# Patient Record
Sex: FEMALE | Race: WHITE | NOT HISPANIC OR LATINO | URBAN - METROPOLITAN AREA
[De-identification: names, ages, dates, MRNs, and addresses within clinical notes are randomized per-mention and may not be internally consistent; named-entity substitution may affect disease eponyms.]

---

## 2018-09-28 PROBLEM — I10 ESSENTIAL HYPERTENSION: Status: ACTIVE | Noted: 2018-09-28

## 2018-09-28 PROBLEM — E78.2 MIXED HYPERLIPIDEMIA: Status: ACTIVE | Noted: 2018-09-28

## 2018-09-28 PROBLEM — Z98.890 HISTORY OF LOOP RECORDER: Status: ACTIVE | Noted: 2018-09-28

## 2018-09-28 PROBLEM — I63.9 CVA (CEREBRAL VASCULAR ACCIDENT) (CMS/HCC): Status: ACTIVE | Noted: 2018-09-28

## 2018-09-28 PROBLEM — E11.9 DIABETES MELLITUS TYPE II, NON INSULIN DEPENDENT (CMS/HCC): Status: ACTIVE | Noted: 2018-09-28

## 2018-10-02 ENCOUNTER — OFFICE VISIT (OUTPATIENT)
Dept: CARDIOLOGY | Facility: CLINIC | Age: 68
End: 2018-10-02
Payer: COMMERCIAL

## 2018-10-02 VITALS
SYSTOLIC BLOOD PRESSURE: 118 MMHG | WEIGHT: 213.6 LBS | BODY MASS INDEX: 36.47 KG/M2 | DIASTOLIC BLOOD PRESSURE: 76 MMHG | OXYGEN SATURATION: 95 % | HEART RATE: 84 BPM | HEIGHT: 64 IN

## 2018-10-02 DIAGNOSIS — I10 ESSENTIAL HYPERTENSION: Primary | ICD-10-CM

## 2018-10-02 DIAGNOSIS — R73.9 HYPERGLYCEMIA: ICD-10-CM

## 2018-10-02 PROBLEM — E11.9 DIABETES MELLITUS TYPE II, NON INSULIN DEPENDENT (CMS/HCC): Status: RESOLVED | Noted: 2018-09-28 | Resolved: 2018-10-02

## 2018-10-02 PROBLEM — T84.82XA: Status: ACTIVE | Noted: 2018-10-02

## 2018-10-02 PROCEDURE — 99204 OFFICE O/P NEW MOD 45 MIN: CPT | Performed by: INTERNAL MEDICINE

## 2018-10-02 PROCEDURE — 93000 ELECTROCARDIOGRAM COMPLETE: CPT | Performed by: INTERNAL MEDICINE

## 2018-10-02 RX ORDER — CHLORTHALIDONE 25 MG/1
25 TABLET ORAL DAILY
Qty: 90 TABLET | Refills: 5 | Status: SHIPPED | OUTPATIENT
Start: 2018-10-02 | End: 2018-11-13 | Stop reason: SDUPTHER

## 2018-10-02 RX ORDER — ATORVASTATIN CALCIUM 10 MG/1
10 TABLET, FILM COATED ORAL DAILY
Qty: 90 TABLET | Refills: 5 | Status: SHIPPED | OUTPATIENT
Start: 2018-10-02 | End: 2018-11-13 | Stop reason: SDUPTHER

## 2018-10-02 RX ORDER — POTASSIUM CHLORIDE 1.5 G/1.58G
20 POWDER, FOR SOLUTION ORAL DAILY
COMMUNITY
End: 2018-11-13 | Stop reason: SDUPTHER

## 2018-10-02 RX ORDER — ATORVASTATIN CALCIUM 10 MG/1
10 TABLET, FILM COATED ORAL DAILY
COMMUNITY
End: 2018-10-02 | Stop reason: SDUPTHER

## 2018-10-02 RX ORDER — CHLORTHALIDONE 25 MG/1
25 TABLET ORAL DAILY
COMMUNITY
End: 2018-10-02 | Stop reason: SDUPTHER

## 2018-10-02 RX ORDER — ASPIRIN 81 MG/1
81 TABLET ORAL DAILY
COMMUNITY

## 2018-10-02 ASSESSMENT — ENCOUNTER SYMPTOMS
DYSPNEA ON EXERTION: 0
INSOMNIA: 0
MUSCLE CRAMPS: 0
BRUISES/BLEEDS EASILY: 0
HEARTBURN: 0
WEIGHT GAIN: 0
DIARRHEA: 0
LIGHT-HEADEDNESS: 0
DIZZINESS: 0
HOARSE VOICE: 0
WEIGHT LOSS: 0
HEMATOLOGIC/LYMPHATIC NEGATIVE: 1
CHANGE IN BOWEL HABIT: 0
TREMORS: 0
FOCAL WEAKNESS: 0
FREQUENCY: 0
NEAR-SYNCOPE: 0
HEMOPTYSIS: 0
PALPITATIONS: 0
JAUNDICE: 0
IRREGULAR HEARTBEAT: 0
WHEEZING: 0
NERVOUS/ANXIOUS: 0
SLEEP DISTURBANCES DUE TO BREATHING: 0
ORTHOPNEA: 0
SPUTUM PRODUCTION: 0
SNORING: 0
SHORTNESS OF BREATH: 0
MYALGIAS: 0
CLAUDICATION: 0
FALLS: 0
ANOREXIA: 0
PND: 0
NAUSEA: 0
ABDOMINAL PAIN: 0
NUMBNESS: 0
CONSTIPATION: 0
MEMORY LOSS: 0
HEADACHES: 0
VERTIGO: 0
SYNCOPE: 0
COUGH: 0

## 2018-10-02 NOTE — ASSESSMENT & PLAN NOTE
In 2013, she had a visual field cut and a stroke evaluate [facility] placed on medical therapy no vascular disease identified he had a long-term implantable monitor that was negative for atrial fibrillation she has not followed up with him in a years no clinical recurrence

## 2018-10-02 NOTE — PROGRESS NOTES
Cardiology Consult/New Patient    Jonathan Benoit Sr.,           Prachi Lopez is a 68 y.o. female identifies as who presents with     She is here to establish cardiac care  She is a history of what sounds like a TIA with visual field cut back in 2013  She was evaluated [facility]  They recommended a long-term monitor she did have an implantable device no episodes of atrial fibrillation were picked up at that time  No history of vascular disease from what she tells me  He remains on blood pressure medications statin and aspirin no recurrence  No known obstructive CAD no chest pain mild shortness of breath with exertion    Patient Active Problem List    Diagnosis Date Noted   • Hyperglycemia 10/02/2018   • Essential hypertension 09/28/2018   • Mixed hyperlipidemia 09/28/2018   • CVA (cerebral vascular accident) (CMS/HCC) ([facility]) 09/28/2018   • History of loop recorder 09/28/2018       Medical History:   Past Medical History:   Diagnosis Date   • Hyperlipidemia    • Hypertension    • Stroke (CMS/HCC) ([facility])        Surgical History:   Past Surgical History:   Procedure Laterality Date   • JOINT REPLACEMENT         Allergies: No known drug allergies    Current Outpatient Prescriptions   Medication Sig Dispense Refill   • aspirin 81 mg enteric coated tablet Take 81 mg by mouth daily.     • atorvastatin (LIPITOR) 10 mg tablet Take 1 tablet (10 mg total) by mouth daily. 90 tablet 5   • chlorthalidone (HYGROTEN) 25 mg tablet Take 1 tablet (25 mg total) by mouth daily. 90 tablet 5   • potassium chloride 20 mEq packet Take 20 mEq by mouth daily.       No current facility-administered medications for this visit.        Social History:   Social History     Social History   • Marital status:      Spouse name: N/A   • Number of children: N/A   • Years of education: N/A     Social History Main Topics   • Smoking status: Never Smoker   • Smokeless tobacco: Never Used   • Alcohol use No   • Drug use: Unknown   •  Sexual activity: Not Asked     Other Topics Concern   • None     Social History Narrative   • None       Family History:   Family History   Problem Relation Age of Onset   • Heart disease Mother    • Heart disease Father        Review of Systems   Review of Systems   Constitution: Negative for malaise/fatigue, weight gain and weight loss.   HENT: Negative for hearing loss and hoarse voice.    Eyes: Negative for visual disturbance.   Cardiovascular: Negative for chest pain, claudication, cyanosis, dyspnea on exertion, irregular heartbeat, leg swelling, near-syncope, orthopnea, palpitations, paroxysmal nocturnal dyspnea and syncope.   Respiratory: Negative for cough, hemoptysis, shortness of breath, sleep disturbances due to breathing, snoring, sputum production and wheezing.    Endocrine: Negative for cold intolerance and heat intolerance.   Hematologic/Lymphatic: Negative.  Negative for bleeding problem. Does not bruise/bleed easily.   Skin: Negative.  Negative for rash.   Musculoskeletal: Positive for joint pain. Negative for arthritis, falls, muscle cramps and myalgias.   Gastrointestinal: Negative for abdominal pain, anorexia, change in bowel habit, constipation, diarrhea, dysphagia, heartburn, jaundice and nausea.   Genitourinary: Negative for frequency and nocturia.   Neurological: Negative for dizziness, focal weakness, headaches, light-headedness, numbness, tremors and vertigo.   Psychiatric/Behavioral: Negative for memory loss. The patient does not have insomnia and is not nervous/anxious.    Allergic/Immunologic: Negative for hives.       Objective       Vitals:    10/02/18 1312   BP: 118/76   Pulse: 84   SpO2: 95%       Physical Exam   Constitutional: She is oriented to person, place, and time. She appears well-developed and well-nourished. No distress.   Overweight   HENT:   Head: Normocephalic and atraumatic.   Nose: Nose normal.   Eyes: Conjunctivae are normal. No scleral icterus.   Neck: No JVD  present.   Cardiovascular: Normal rate, regular rhythm, normal heart sounds and intact distal pulses.  Exam reveals no gallop and no friction rub.    No murmur heard.  Pulmonary/Chest: Effort normal. No stridor. No respiratory distress. She has no wheezes. She has no rales. She exhibits no tenderness.   Abdominal: There is no tenderness.   Musculoskeletal: She exhibits no edema or deformity.   Neurological: She is alert and oriented to person, place, and time.   Skin: Skin is warm and dry.   Psychiatric: She has a normal mood and affect.        Labs   No results found for: WBC, HGB, HCT, PLT, CHOL, TRIG, HDL, LDLDIRECT, ALT, AST, NA, K, CL, CREATININE, BUN, CO2, TSH, PSA, INR, HGBA1C, MICROALBUR    Imaging      ECG   sinus rhythm nsr non spec st t chagnes      Assessment/Plan     CVA (cerebral vascular accident) (CMS/HCC) ([facility])  In 2013, she had a visual field cut and a stroke evaluate [facility] placed on medical therapy no vascular disease identified he had a long-term implantable monitor that was negative for atrial fibrillation she has not followed up with him in a years no clinical recurrence    Essential hypertension  Controlled with diuretic and potassium supplement  Plan ischemic evaluation, stress echo    Hyperglycemia  She was told her sugar was elevated in the past recheck numbers    Mixed hyperlipidemia  On low-dose statin will be aggressive if not at goal with her history of stroke       See her back after lab work and at time of stress echocardiogram  She has a knee replacement, we will see how she does on the treadmill does not do well plan pharmacologic nuclear stress    Jaylen Lynne MD  10/2/2018

## 2018-10-24 ENCOUNTER — TELEPHONE (OUTPATIENT)
Dept: CARDIOLOGY | Facility: CLINIC | Age: 68
End: 2018-10-24

## 2018-10-26 LAB
ALBUMIN SERPL-MCNC: 4.1 G/DL (ref 3.6–5.1)
ALBUMIN/GLOB SERPL: 1.4 (CALC) (ref 1–2.5)
ALP SERPL-CCNC: 101 U/L (ref 33–130)
ALT SERPL-CCNC: 8 U/L (ref 6–29)
AST SERPL-CCNC: 14 U/L (ref 10–35)
BASOPHILS # BLD AUTO: 27 CELLS/UL (ref 0–200)
BASOPHILS NFR BLD AUTO: 0.3 %
BILIRUB SERPL-MCNC: 0.7 MG/DL (ref 0.2–1.2)
BUN SERPL-MCNC: 22 MG/DL (ref 7–25)
BUN/CREAT SERPL: ABNORMAL (CALC) (ref 6–22)
CALCIUM SERPL-MCNC: 9.3 MG/DL (ref 8.6–10.4)
CHLORIDE SERPL-SCNC: 100 MMOL/L (ref 98–110)
CHOLEST SERPL-MCNC: 123 MG/DL
CHOLEST/HDLC SERPL: 2.6 (CALC)
CO2 SERPL-SCNC: 32 MMOL/L (ref 20–32)
CREAT SERPL-MCNC: 0.8 MG/DL (ref 0.5–0.99)
EOSINOPHIL # BLD AUTO: 180 CELLS/UL (ref 15–500)
EOSINOPHIL NFR BLD AUTO: 2 %
ERYTHROCYTE [DISTWIDTH] IN BLOOD BY AUTOMATED COUNT: 12.9 % (ref 11–15)
GFR SERPL CREATININE-BSD FRML MDRD: 76 ML/MIN/1.73M2
GLOBULIN SER CALC-MCNC: 2.9 G/DL (CALC) (ref 1.9–3.7)
GLUCOSE SERPL-MCNC: 106 MG/DL (ref 65–99)
HBA1C MFR BLD: 6.3 % OF TOTAL HGB
HCT VFR BLD AUTO: 39.2 % (ref 35–45)
HDLC SERPL-MCNC: 47 MG/DL
HGB BLD-MCNC: 13 G/DL (ref 11.7–15.5)
LDLC SERPL CALC-MCNC: 59 MG/DL (CALC)
LYMPHOCYTES # BLD AUTO: 1629 CELLS/UL (ref 850–3900)
LYMPHOCYTES NFR BLD AUTO: 18.1 %
MCH RBC QN AUTO: 30.2 PG (ref 27–33)
MCHC RBC AUTO-ENTMCNC: 33.2 G/DL (ref 32–36)
MCV RBC AUTO: 91 FL (ref 80–100)
MONOCYTES # BLD AUTO: 513 CELLS/UL (ref 200–950)
MONOCYTES NFR BLD AUTO: 5.7 %
NEUTROPHILS # BLD AUTO: 6651 CELLS/UL (ref 1500–7800)
NEUTROPHILS NFR BLD AUTO: 73.9 %
NONHDLC SERPL-MCNC: 76 MG/DL (CALC)
PLATELET # BLD AUTO: 292 THOUSAND/UL (ref 140–400)
PMV BLD REES-ECKER: 11.1 FL (ref 7.5–12.5)
POTASSIUM SERPL-SCNC: 3.8 MMOL/L (ref 3.5–5.3)
PROT SERPL-MCNC: 7 G/DL (ref 6.1–8.1)
RBC # BLD AUTO: 4.31 MILLION/UL (ref 3.8–5.1)
SODIUM SERPL-SCNC: 141 MMOL/L (ref 135–146)
TRIGL SERPL-MCNC: 89 MG/DL
TSH SERPL-ACNC: 2.6 MIU/L (ref 0.4–4.5)
WBC # BLD AUTO: 9 THOUSAND/UL (ref 3.8–10.8)

## 2018-11-01 NOTE — PROGRESS NOTES
Cardiology Note    Jonathan Benoit Sr.,           Prachi Lopez is a 68 y.o. female   She returns for follow-up and stress echocardiogram  No evidence of infarction or ischemia with stress echocardiogram    She had a CVA with visual field cut 2013 evaluated [facility] actually had a implantable monitor no arrhythmias, no atrial fibrillation no vascular disease noted  History of hypertension hyperglycemia mixed hyperlipidemia       k    Patient Active Problem List    Diagnosis Date Noted   • Hyperglycemia 10/02/2018   • Arthrofibrosis of total knee arthroplasty (CMS/[facility]) ([facility]) 10/02/2018   • Essential hypertension 09/28/2018   • Mixed hyperlipidemia 09/28/2018   • CVA (cerebral vascular accident) (CMS/[facility]) ([facility]) 09/28/2018   • History of loop recorder 09/28/2018       Allergy    No known drug allergies          MED LIST       Current Outpatient Prescriptions   Medication Sig Dispense Refill   • aspirin 81 mg enteric coated tablet Take 81 mg by mouth daily.     • atorvastatin (LIPITOR) 10 mg tablet Take 1 tablet (10 mg total) by mouth daily. 90 tablet 5   • chlorthalidone (HYGROTEN) 25 mg tablet Take 1 tablet (25 mg total) by mouth daily. 90 tablet 6   • potassium chloride 20 mEq packet Take 20 mEq by mouth daily. 90 packet 6     No current facility-administered medications for this visit.                 Review of Systems   Constitution: Negative for malaise/fatigue, weight gain and weight loss.   HENT: Negative for hearing loss and hoarse voice.    Eyes: Negative for visual disturbance.   Cardiovascular: Negative for chest pain, claudication, cyanosis, dyspnea on exertion, irregular heartbeat, leg swelling, near-syncope, orthopnea, palpitations, paroxysmal nocturnal dyspnea and syncope.   Respiratory: Negative for cough, hemoptysis, shortness of breath, sleep disturbances due to breathing, snoring, sputum production and wheezing.    Endocrine: Negative for cold intolerance and heat intolerance.  "  Hematologic/Lymphatic: Negative.  Negative for bleeding problem. Does not bruise/bleed easily.   Skin: Negative.  Negative for rash.   Musculoskeletal: Positive for joint pain. Negative for arthritis, falls, muscle cramps and myalgias.   Gastrointestinal: Negative for abdominal pain, anorexia, change in bowel habit, constipation, diarrhea, dysphagia, heartburn, jaundice and nausea.   Genitourinary: Negative for frequency and nocturia.   Neurological: Negative for dizziness, focal weakness, headaches, light-headedness, numbness, tremors and vertigo.   Psychiatric/Behavioral: Negative for memory loss. The patient does not have insomnia and is not nervous/anxious.    Allergic/Immunologic: Negative for hives.       Labs   Lab Results   Component Value Date    WBC 9.0 10/25/2018    HGB 13.0 10/25/2018    HCT 39.2 10/25/2018     10/25/2018    CHOL 123 10/25/2018    TRIG 89 10/25/2018    HDL 47 (L) 10/25/2018    LDLCALC 59 10/25/2018    ALT 8 10/25/2018    AST 14 10/25/2018     10/25/2018    K 3.8 10/25/2018     10/25/2018    CREATININE 0.80 10/25/2018    CO2 32 10/25/2018    TSH 2.60 10/25/2018    HGBA1C 6.3 (H) 10/25/2018       Lab Results   Component Value Date    GLUCOSE 106 (H) 10/25/2018    CALCIUM 9.3 10/25/2018     10/25/2018    K 3.8 10/25/2018    CO2 32 10/25/2018     10/25/2018    CREATININE 0.80 10/25/2018         Objective   Vitals:    11/13/18 1357   BP: 110/78   BP Location: Right upper arm   Patient Position: Standing   Pulse: 90   SpO2: 98%   Weight: 97.3 kg (214 lb 6.4 oz)   Height: 1.638 m (5' 4.5\")     Physical Exam   Constitutional: She is oriented to person, place, and time. She appears well-developed and well-nourished. No distress.   Overweight   HENT:   Head: Normocephalic and atraumatic.   Nose: Nose normal.   Eyes: Conjunctivae are normal. No scleral icterus.   Neck: No JVD present.   Cardiovascular: Normal rate, regular rhythm, normal heart sounds and intact " distal pulses.  Exam reveals no gallop and no friction rub.    No murmur heard.  Pulmonary/Chest: Effort normal. No stridor. No respiratory distress. She has no wheezes. She has no rales. She exhibits no tenderness.   Abdominal: There is no tenderness.   Musculoskeletal: She exhibits no edema or deformity.   Neurological: She is alert and oriented to person, place, and time.   Skin: Skin is warm and dry.   Psychiatric: She has a normal mood and affect.         Cardiac Procedures    ELECTROPHYSIOLOGY    2013 IMPLANTABLE DEVICE NO AFIB [facility]        ECHO   echo 2016 lvh normal ef mac mildmr  Joe  2013 [facility] reportedly negative      VASCULAR  vasc mra [facility] 2103 reportedly neg      EKG      Assessment/Plan:      CVA (cerebral vascular accident) (CMS/HCC) (H[facility])  Field cut with stroke in 2013 neurologic evaluation of pain including long-term implantable monitor no A. fib treated with statin and antiplatelet    Essential hypertension  Controlled on chlorthalidone potassium supplement stress echo today negative for ischemia    Hyperglycemia  She is going to watch her diet follow-up lab work in 6 months A1c 6.3 briefly discussed metformin she will follow-up with you    Mixed hyperlipidemia  On atorvastatin LDL cholesterol 49         Change in her medication follow-up in 6 months with lab work  Attention to her sugar to see if pharmaceutical therapy required for mild hyperglycemia  Probably be aggressive in a patient with history of cerebrovascular disease  We will order carotid duplex at next visit had negative neurologic workup at time of her stroke at [facility]    Thank you for allowing me to participate in the care of this patient.  I hope this information is helpful.    Jaylen Lynne MD [facility]   11/13/2018  Jonathan Hines Sr., DO

## 2018-11-13 ENCOUNTER — OFFICE VISIT (OUTPATIENT)
Dept: CARDIOLOGY | Facility: CLINIC | Age: 68
End: 2018-11-13
Payer: COMMERCIAL

## 2018-11-13 ENCOUNTER — HOSPITAL ENCOUNTER (OUTPATIENT)
Dept: CARDIOLOGY | Facility: CLINIC | Age: 68
Discharge: HOME | End: 2018-11-13
Payer: COMMERCIAL

## 2018-11-13 VITALS
DIASTOLIC BLOOD PRESSURE: 80 MMHG | HEIGHT: 64 IN | WEIGHT: 214 LBS | SYSTOLIC BLOOD PRESSURE: 120 MMHG | BODY MASS INDEX: 36.54 KG/M2

## 2018-11-13 VITALS
DIASTOLIC BLOOD PRESSURE: 78 MMHG | HEIGHT: 65 IN | SYSTOLIC BLOOD PRESSURE: 110 MMHG | OXYGEN SATURATION: 98 % | BODY MASS INDEX: 35.72 KG/M2 | HEART RATE: 90 BPM | WEIGHT: 214.4 LBS

## 2018-11-13 DIAGNOSIS — I10 ESSENTIAL HYPERTENSION: ICD-10-CM

## 2018-11-13 DIAGNOSIS — R73.9 HYPERGLYCEMIA: ICD-10-CM

## 2018-11-13 DIAGNOSIS — E78.2 MIXED HYPERLIPIDEMIA: ICD-10-CM

## 2018-11-13 DIAGNOSIS — T84.82XS ARTHROFIBROSIS OF TOTAL KNEE REPLACEMENT, SEQUELA: ICD-10-CM

## 2018-11-13 DIAGNOSIS — I63.89 CEREBROVASCULAR ACCIDENT (CVA) DUE TO OTHER MECHANISM: Primary | ICD-10-CM

## 2018-11-13 LAB
AORTIC VALVE MEAN VELOCITY: 0.82 M/S
AORTIC VALVE VELOCITY TIME INTEGRAL: 24.5 CM
ASCENDING AORTA: 3.4 CM
AV MEAN GRADIENT: 3 MMHG
AV PEAK GRADIENT: 8 MMHG
AV PEAK VELOCITY-S: 1.4 M/S
AV VALVE AREA: 1.91 CM2
AV VALVE AREA: 2.05 CM2
BSA FOR ECHO PROCEDURE: 2.09 M2
DOP CALC LVOT STROKE VOLUME: 50.36 ML
E WAVE DECELERATION TIME: 173 MS
E/A RATIO: 0.6
E/E' RATIO: 4.4
E/LAT E' RATIO: 4.6
EDV (BP): 54.1 CM3
EF (A4C): 70.1 %
EF A2C: 73.6 %
EJECTION FRACTION: 72.5 %
ESV (BP): 14.9 CM3
LA ESV (BP): 47.4 CM3
LA ESV INDEX (A2C): 18.71 CM3/M2
LA ESV INDEX (BP): 22.68 CM3/M2
LAAS-AP2: 14.8 CM2
LAAS-AP4: 17.9 CM2
LALD A4C: 4.46 CM
LALD A4C: 4.46 CM
LAV-S: 39.1 CM3
LEFT ATRIUM VOLUME INDEX: 22.68 CM3/M2
LEFT ATRIUM VOLUME: 47.4 CM3
LEFT VENTRICLE DIASTOLIC VOLUME INDEX: 29.9 CM3/M2
LEFT VENTRICLE DIASTOLIC VOLUME: 62.5 CM3
LEFT VENTRICLE SYSTOLIC VOLUME INDEX: 8.95 CM3/M2
LEFT VENTRICLE SYSTOLIC VOLUME: 18.7 CM3
LV DIASTOLIC VOLUME: 44.7 CM3
LV ESV (APICAL 2 CHAMBER): 11.8 CM3
LVAD-AP2: 20 CM2
LVAD-AP4: 22.6 CM2
LVAS-AP2: 8.81 CM2
LVAS-AP4: 11.4 CM2
LVEDVI(A2C): 21.39 CM3/M2
LVEDVI(BP): 25.89 CM3/M2
LVESVI(A2C): 5.65 CM3/M2
LVESVI(BP): 7.13 CM3/M2
LVLD-AP2: 7.33 CM
LVLD-AP4: 6.89 CM
LVLS-AP2: 5.68 CM
LVLS-AP4: 5.78 CM
LVOT 2D: 1.8 CM
LVOT A: 2.54 CM2
LVOT MG: 2 MMHG
LVOT MV: 0.66 M/S
LVOT PEAK VELOCITY: 1.05 M/S
LVOT VTI: 19.8 CM
MV E'TISSUE VEL-LAT: 0.1 M/S
MV E'TISSUE VEL-MED: 0.1 M/S
MV PEAK A VEL: 0.8 M/S
MV PEAK E VEL: 0.44 M/S
RVOT VMAX: 0.76 M/S
STRESS ANGINA INDEX: 0
STRESS BASELINE BP: NORMAL MMHG
STRESS BASELINE HR: 92 BPM
STRESS O2 SAT REST: 97 %
STRESS PERCENT HR: 107 %
STRESS POST ESTIMATED WORKLOAD: 7 METS
STRESS POST EXERCISE DUR MIN: 3 MIN
STRESS POST EXERCISE DUR SEC: 55 SEC
STRESS POST PEAK BP: NORMAL MMHG
STRESS POST PEAK HR: 163 BPM
STRESS TARGET HR: 130 BPM

## 2018-11-13 PROCEDURE — 99214 OFFICE O/P EST MOD 30 MIN: CPT | Performed by: INTERNAL MEDICINE

## 2018-11-13 PROCEDURE — 93350 STRESS TTE ONLY: CPT | Performed by: INTERNAL MEDICINE

## 2018-11-13 RX ORDER — ATORVASTATIN CALCIUM 10 MG/1
10 TABLET, FILM COATED ORAL DAILY
Qty: 90 TABLET | Refills: 5 | Status: SHIPPED | OUTPATIENT
Start: 2018-11-13 | End: 2019-08-19 | Stop reason: SDUPTHER

## 2018-11-13 RX ORDER — POTASSIUM CHLORIDE 1.5 G/1.58G
20 POWDER, FOR SOLUTION ORAL DAILY
Qty: 90 PACKET | Refills: 6 | Status: SHIPPED | OUTPATIENT
Start: 2018-11-13 | End: 2018-11-13 | Stop reason: SDUPTHER

## 2018-11-13 RX ORDER — POTASSIUM CHLORIDE 1.5 G/1.58G
20 POWDER, FOR SOLUTION ORAL DAILY
Qty: 90 PACKET | Refills: 6 | Status: SHIPPED | OUTPATIENT
Start: 2018-11-13 | End: 2019-08-19 | Stop reason: SDUPTHER

## 2018-11-13 RX ORDER — CHLORTHALIDONE 25 MG/1
25 TABLET ORAL DAILY
Qty: 90 TABLET | Refills: 6 | Status: SHIPPED | OUTPATIENT
Start: 2018-11-13 | End: 2019-08-19 | Stop reason: SDUPTHER

## 2018-11-13 RX ORDER — ATORVASTATIN CALCIUM 10 MG/1
10 TABLET, FILM COATED ORAL DAILY
Qty: 90 TABLET | Refills: 5 | Status: SHIPPED | OUTPATIENT
Start: 2018-11-13 | End: 2018-11-13 | Stop reason: SDUPTHER

## 2018-11-13 RX ORDER — CHLORTHALIDONE 25 MG/1
25 TABLET ORAL DAILY
Qty: 90 TABLET | Refills: 6 | Status: SHIPPED | OUTPATIENT
Start: 2018-11-13 | End: 2018-11-13 | Stop reason: SDUPTHER

## 2018-11-13 ASSESSMENT — ENCOUNTER SYMPTOMS
INSOMNIA: 0
MEMORY LOSS: 0
SLEEP DISTURBANCES DUE TO BREATHING: 0
IRREGULAR HEARTBEAT: 0
ABDOMINAL PAIN: 0
LIGHT-HEADEDNESS: 0
TREMORS: 0
FOCAL WEAKNESS: 0
FREQUENCY: 0
NEAR-SYNCOPE: 0
JAUNDICE: 0
MUSCLE CRAMPS: 0
WHEEZING: 0
WEIGHT LOSS: 0
NAUSEA: 0
VERTIGO: 0
HOARSE VOICE: 0
NUMBNESS: 0
FALLS: 0
SNORING: 0
NERVOUS/ANXIOUS: 0
DYSPNEA ON EXERTION: 0
CHANGE IN BOWEL HABIT: 0
SPUTUM PRODUCTION: 0
DIARRHEA: 0
MYALGIAS: 0
PALPITATIONS: 0
BRUISES/BLEEDS EASILY: 0
ANOREXIA: 0
CLAUDICATION: 0
PND: 0
COUGH: 0
DIZZINESS: 0
HEADACHES: 0
CONSTIPATION: 0
HEMOPTYSIS: 0
HEMATOLOGIC/LYMPHATIC NEGATIVE: 1
HEARTBURN: 0
SYNCOPE: 0
ORTHOPNEA: 0
WEIGHT GAIN: 0
SHORTNESS OF BREATH: 0

## 2018-11-13 NOTE — ASSESSMENT & PLAN NOTE
She is going to watch her diet follow-up lab work in 6 months A1c 6.3 briefly discussed metformin she will follow-up with you

## 2018-11-13 NOTE — ASSESSMENT & PLAN NOTE
Field cut with stroke in 2013 neurologic evaluation of pain including long-term implantable monitor no A. fib treated with statin and antiplatelet

## 2018-11-13 NOTE — LETTER
November 13, 2018     Jonathan Benoit Sr.,   [address] [address]    Patient: Prachi Lopez   YOB: 1950   Date of Visit: 11/13/2018       Dear Jonathan:    Thank you for referring Prachi Lopez to me for evaluation. Below are my notes for this consultation.    If you have questions, please do not hesitate to call me. I look forward to following your patient along with you.         Sincerely,        Jaylen Lynne MD        CC: No Recipients  Jaylen Lynne MD  11/13/2018  2:31 PM  Sign at close encounter      Cardiology Note    Jonathan Benoit Sr.,           Prachi Lopez is a 68 y.o. female    She returns for follow-up and stress echocardiogram  No evidence of infarction or ischemia with stress echocardiogram    She had a CVA with visual field cut 2013 evaluated [facility] actually had a implantable monitor no arrhythmias, no atrial fibrillation no vascular disease noted  History of hypertension hyperglycemia mixed hyperlipidemia       k    Patient Active Problem List    Diagnosis Date Noted   • Hyperglycemia 10/02/2018   • Arthrofibrosis of total knee arthroplasty (CMS/[facility]) ([facility]) 10/02/2018   • Essential hypertension 09/28/2018   • Mixed hyperlipidemia 09/28/2018   • CVA (cerebral vascular accident) (CMS/[facility]) ([facility]) 09/28/2018   • History of loop recorder 09/28/2018       Allergy    No known drug allergies          MED LIST       Current Outpatient Prescriptions   Medication Sig Dispense Refill   • aspirin 81 mg enteric coated tablet Take 81 mg by mouth daily.     • atorvastatin (LIPITOR) 10 mg tablet Take 1 tablet (10 mg total) by mouth daily. 90 tablet 5   • chlorthalidone (HYGROTEN) 25 mg tablet Take 1 tablet (25 mg total) by mouth daily. 90 tablet 6   • potassium chloride 20 mEq packet Take 20 mEq by mouth daily. 90 packet 6     No current facility-administered medications for this visit.                 Review of Systems   Constitution: Negative for malaise/fatigue,  weight gain and weight loss.   HENT: Negative for hearing loss and hoarse voice.    Eyes: Negative for visual disturbance.   Cardiovascular: Negative for chest pain, claudication, cyanosis, dyspnea on exertion, irregular heartbeat, leg swelling, near-syncope, orthopnea, palpitations, paroxysmal nocturnal dyspnea and syncope.   Respiratory: Negative for cough, hemoptysis, shortness of breath, sleep disturbances due to breathing, snoring, sputum production and wheezing.    Endocrine: Negative for cold intolerance and heat intolerance.   Hematologic/Lymphatic: Negative.  Negative for bleeding problem. Does not bruise/bleed easily.   Skin: Negative.  Negative for rash.   Musculoskeletal: Positive for joint pain. Negative for arthritis, falls, muscle cramps and myalgias.   Gastrointestinal: Negative for abdominal pain, anorexia, change in bowel habit, constipation, diarrhea, dysphagia, heartburn, jaundice and nausea.   Genitourinary: Negative for frequency and nocturia.   Neurological: Negative for dizziness, focal weakness, headaches, light-headedness, numbness, tremors and vertigo.   Psychiatric/Behavioral: Negative for memory loss. The patient does not have insomnia and is not nervous/anxious.    Allergic/Immunologic: Negative for hives.       Labs   Lab Results   Component Value Date    WBC 9.0 10/25/2018    HGB 13.0 10/25/2018    HCT 39.2 10/25/2018     10/25/2018    CHOL 123 10/25/2018    TRIG 89 10/25/2018    HDL 47 (L) 10/25/2018    LDLCALC 59 10/25/2018    ALT 8 10/25/2018    AST 14 10/25/2018     10/25/2018    K 3.8 10/25/2018     10/25/2018    CREATININE 0.80 10/25/2018    CO2 32 10/25/2018    TSH 2.60 10/25/2018    HGBA1C 6.3 (H) 10/25/2018       Lab Results   Component Value Date    GLUCOSE 106 (H) 10/25/2018    CALCIUM 9.3 10/25/2018     10/25/2018    K 3.8 10/25/2018    CO2 32 10/25/2018     10/25/2018    CREATININE 0.80 10/25/2018         Objective   Vitals:    11/13/18 1357  "  BP: 110/78   BP Location: Right upper arm   Patient Position: Standing   Pulse: 90   SpO2: 98%   Weight: 97.3 kg (214 lb 6.4 oz)   Height: 1.638 m (5' 4.5\")     Physical Exam   Constitutional: She is oriented to person, place, and time. She appears well-developed and well-nourished. No distress.   Overweight   HENT:   Head: Normocephalic and atraumatic.   Nose: Nose normal.   Eyes: Conjunctivae are normal. No scleral icterus.   Neck: No JVD present.   Cardiovascular: Normal rate, regular rhythm, normal heart sounds and intact distal pulses.  Exam reveals no gallop and no friction rub.    No murmur heard.  Pulmonary/Chest: Effort normal. No stridor. No respiratory distress. She has no wheezes. She has no rales. She exhibits no tenderness.   Abdominal: There is no tenderness.   Musculoskeletal: She exhibits no edema or deformity.   Neurological: She is alert and oriented to person, place, and time.   Skin: Skin is warm and dry.   Psychiatric: She has a normal mood and affect.         Cardiac Procedures    ELECTROPHYSIOLOGY    2013 IMPLANTABLE DEVICE NO AFIB [facility]        ECHO   echo 2016 lvh normal ef mac mildmr  EKG      Assessment/Plan:      CVA (cerebral vascular accident) (CMS/HCC) ([facility])  Field cut with stroke in 2013 neurologic evaluation of pain including long-term implantable monitor no A. fib treated with statin and antiplatelet    Essential hypertension  Controlled on chlorthalidone potassium supplement stress echo today negative for ischemia    Hyperglycemia  She is going to watch her diet follow-up lab work in 6 months A1c 6.3 briefly discussed metformin she will follow-up with you    Mixed hyperlipidemia  On atorvastatin LDL cholesterol 49         Change in her medication follow-up in 6 months with lab work  Attention to her sugar to see if pharmaceutical therapy required for mild hyperglycemia  Probably be aggressive in a patient with history of cerebrovascular disease  We will order carotid duplex at " next visit had negative neurologic workup at time of her stroke at [facility]    Thank you for allowing me to participate in the care of this patient.  I hope this information is helpful.    Jaylen Lynne MD [facility]   11/13/2018  Cc Jonathan Benoit Sr., DO

## 2019-05-09 ENCOUNTER — TELEPHONE (OUTPATIENT)
Dept: CARDIOLOGY | Facility: CLINIC | Age: 69
End: 2019-05-09

## 2019-05-09 NOTE — TELEPHONE ENCOUNTER
I called pt to confirm OV -- 5/13/19- LMOM for im to call me back.    Did pt have labs done, where?

## 2019-07-16 ENCOUNTER — TELEPHONE (OUTPATIENT)
Dept: SCHEDULING | Facility: CLINIC | Age: 69
End: 2019-07-16

## 2019-08-13 ASSESSMENT — ENCOUNTER SYMPTOMS
JAUNDICE: 0
CHANGE IN BOWEL HABIT: 0
ABDOMINAL PAIN: 0
FREQUENCY: 0
DYSPNEA ON EXERTION: 0
HEMOPTYSIS: 0
CLAUDICATION: 0
FALLS: 0
LIGHT-HEADEDNESS: 0
HEARTBURN: 0
MUSCLE CRAMPS: 0
NERVOUS/ANXIOUS: 0
SNORING: 0
DIARRHEA: 0
COUGH: 0
NUMBNESS: 0
BRUISES/BLEEDS EASILY: 0
MYALGIAS: 0
PALPITATIONS: 0
VERTIGO: 0
WHEEZING: 0
HOARSE VOICE: 0
TREMORS: 0
PND: 0
MEMORY LOSS: 0
SHORTNESS OF BREATH: 0
HEMATOLOGIC/LYMPHATIC NEGATIVE: 1
ANOREXIA: 0
SYNCOPE: 0
DIZZINESS: 0
SPUTUM PRODUCTION: 0
NEAR-SYNCOPE: 0
WEIGHT LOSS: 0
HEADACHES: 0
IRREGULAR HEARTBEAT: 0
WEIGHT GAIN: 0
FOCAL WEAKNESS: 0
CONSTIPATION: 0
NAUSEA: 0
ORTHOPNEA: 0
SLEEP DISTURBANCES DUE TO BREATHING: 0
INSOMNIA: 0

## 2019-08-13 NOTE — PROGRESS NOTES
Cardiology Note    Jonathan Benoit Sr.,           Prachi Lopez is a 69 y.o. female   She returns for cardiac follow-up  She told me she had a stroke in 2013 with field cut  She had a implantable monitor that time no atrial arrhythmias are picked up she is treated with statin and antiplatelet  She for hypertension hyperglycemia mixed hyperlipidemia  She had negative stress echocardiogram November 2018              Electronically signed by Jaylen Lynne MD at 11/13/2018  2:47 PM        Office Visit on 11/13/2018            Revision History                            k    Patient Active Problem List    Diagnosis Date Noted   • Hyperglycemia 10/02/2018   • Arthrofibrosis of total knee arthroplasty (CMS/[facility]) ([facility]) 10/02/2018   • Essential hypertension 09/28/2018   • Mixed hyperlipidemia 09/28/2018   • CVA (cerebral vascular accident) (CMS/[facility]) ([facility]) 09/28/2018   • History of loop recorder 09/28/2018       Allergy    No known drug allergies          MED LIST       Current Outpatient Prescriptions   Medication Sig Dispense Refill   • aspirin 81 mg enteric coated tablet Take 81 mg by mouth daily.     • atorvastatin (LIPITOR) 10 mg tablet BRAND NECESSARY ONE A DAY 90 tablet 6   • chlorthalidone (HYGROTEN) 25 mg tablet Take 1 tablet (25 mg total) by mouth daily. 90 tablet 6   • potassium chloride 20 mEq packet Take 20 mEq by mouth daily. 90 packet 6     No current facility-administered medications for this visit.                 Review of Systems   Constitution: Negative for malaise/fatigue, weight gain and weight loss.   HENT: Negative for hearing loss and hoarse voice.    Eyes: Negative for visual disturbance.   Cardiovascular: Negative for chest pain, claudication, cyanosis, dyspnea on exertion, irregular heartbeat, leg swelling, near-syncope, orthopnea, palpitations, paroxysmal nocturnal dyspnea and syncope.   Respiratory: Negative for cough, hemoptysis, shortness of breath, sleep disturbances due to breathing,  "snoring, sputum production and wheezing.    Endocrine: Negative for cold intolerance and heat intolerance.   Hematologic/Lymphatic: Negative.  Negative for bleeding problem. Does not bruise/bleed easily.   Skin: Negative.  Negative for rash.   Musculoskeletal: Positive for joint pain. Negative for arthritis, falls, muscle cramps and myalgias.   Gastrointestinal: Negative for abdominal pain, anorexia, change in bowel habit, constipation, diarrhea, dysphagia, heartburn, jaundice and nausea.   Genitourinary: Negative for frequency and nocturia.   Neurological: Negative for dizziness, focal weakness, headaches, light-headedness, numbness, tremors and vertigo.   Psychiatric/Behavioral: Negative for memory loss. The patient does not have insomnia and is not nervous/anxious.    Allergic/Immunologic: Negative for hives.       Labs   Lab Results   Component Value Date    WBC 9.1 08/05/2019    HGB 12.7 08/05/2019    HCT 39.5 08/05/2019     08/05/2019    CHOL 114 08/05/2019    TRIG 85 08/05/2019    HDL 43 (L) 08/05/2019    LDLCALC 54 08/05/2019    ALT 9 08/05/2019    AST 14 08/05/2019     08/05/2019    K 4.2 08/05/2019     08/05/2019    CREATININE 0.88 08/05/2019    BUN 15 08/05/2019    CO2 36 (H) 08/05/2019    TSH 4.45 08/05/2019    HGBA1C 6.9 (H) 08/05/2019       Lab Results   Component Value Date    GLUCOSE 136 (H) 08/05/2019    CALCIUM 9.4 08/05/2019     08/05/2019    K 4.2 08/05/2019    CO2 36 (H) 08/05/2019     08/05/2019    BUN 15 08/05/2019    CREATININE 0.88 08/05/2019         Objective   Vitals:    08/19/19 0904   BP: 124/84   BP Location: Left upper arm   Patient Position: Sitting   Pulse: 82   SpO2: 97%   Weight: 98.4 kg (217 lb)   Height: 1.638 m (5' 4.5\")     Physical Exam   Constitutional: She is oriented to person, place, and time. She appears well-developed and well-nourished. No distress.   Overweight   HENT:   Head: Normocephalic and atraumatic.   Nose: Nose normal.   Eyes: " Conjunctivae are normal. No scleral icterus.   Neck: No JVD present.   Cardiovascular: Normal rate, regular rhythm, normal heart sounds and intact distal pulses.  Exam reveals no gallop and no friction rub.    No murmur heard.  Pulmonary/Chest: Effort normal. No stridor. No respiratory distress. She has no wheezes. She has no rales. She exhibits no tenderness.   Abdominal: There is no tenderness.   Musculoskeletal: She exhibits no edema or deformity.   Neurological: She is alert and oriented to person, place, and time.   Skin: Skin is warm and dry.   Psychiatric: She has a normal mood and affect.     ekb 2018 normla    Cardiac Procedures      STRESS  Stress echo 11/18 negaitve mild mr         ELECTROPHYSIOLOGY    2013 IMPLANTABLE DEVICE NO AFIB [facility]        ECHO   echo 2016 lvh normal ef mac mildmr    Joe  2013 u of p reportedly negative      VASCULAR  vasc mra u of p 2013reportedly neg      EKG      Assessment/Plan:      CVA (cerebral vascular accident) (CMS/HCC) ([facility])  Visual issues in 2013 with a field cut possible including CT angiogram was unremarkable implantable device no atrial arrhythmias remains on antiplatelet and statin    Essential hypertension  Controlled on diuretic potassium supplement negative stress echocardiogram November 2018 EKG not done today due to poison ivy on her chest    Mixed hyperlipidemia  On atorvastatin LDL cholesterol 54 at goal    Hyperglycemia  Hemoglobin A1c 6.9 sugar 1 36-12 to about possible metformin I do not think she is a big fan of taking medication       \I asked her to talk to you to see if metformin was indicated   I am not sure if she would be willing to take it at this time  Hemoglobin A1c 6.9  She is going to try to diet  I made no changes in her cardiac medications  Follow-up in 6 months with rest echo and carotid duplex    Thank you for allowing me to participate in the care of this patient.  I hope this information is helpful.    Jaylen Lynne MD [facility]    8/19/2019  Cc Jonathan Benoit Sr., DO

## 2019-08-19 ENCOUNTER — OFFICE VISIT (OUTPATIENT)
Dept: CARDIOLOGY | Facility: CLINIC | Age: 69
End: 2019-08-19
Payer: COMMERCIAL

## 2019-08-19 ENCOUNTER — TELEPHONE (OUTPATIENT)
Dept: CARDIOLOGY | Facility: CLINIC | Age: 69
End: 2019-08-19

## 2019-08-19 VITALS
BODY MASS INDEX: 36.15 KG/M2 | SYSTOLIC BLOOD PRESSURE: 124 MMHG | HEIGHT: 65 IN | HEART RATE: 82 BPM | WEIGHT: 217 LBS | DIASTOLIC BLOOD PRESSURE: 84 MMHG | OXYGEN SATURATION: 97 %

## 2019-08-19 DIAGNOSIS — I63.89 CEREBROVASCULAR ACCIDENT (CVA) DUE TO OTHER MECHANISM: ICD-10-CM

## 2019-08-19 DIAGNOSIS — T84.82XS ARTHROFIBROSIS OF TOTAL KNEE REPLACEMENT, SEQUELA: Primary | ICD-10-CM

## 2019-08-19 DIAGNOSIS — R73.9 HYPERGLYCEMIA: ICD-10-CM

## 2019-08-19 DIAGNOSIS — E78.2 MIXED HYPERLIPIDEMIA: ICD-10-CM

## 2019-08-19 PROCEDURE — 99214 OFFICE O/P EST MOD 30 MIN: CPT | Performed by: INTERNAL MEDICINE

## 2019-08-19 RX ORDER — POTASSIUM CHLORIDE 1.5 G/1.58G
20 POWDER, FOR SOLUTION ORAL DAILY
Qty: 90 PACKET | Refills: 6 | Status: SHIPPED | OUTPATIENT
Start: 2019-08-19 | End: 2020-01-15 | Stop reason: SDUPTHER

## 2019-08-19 RX ORDER — ATORVASTATIN CALCIUM 10 MG/1
TABLET, FILM COATED ORAL
Qty: 90 TABLET | Refills: 6 | Status: SHIPPED | OUTPATIENT
Start: 2019-08-19 | End: 2019-12-13 | Stop reason: SDUPTHER

## 2019-08-19 RX ORDER — CHLORTHALIDONE 25 MG/1
25 TABLET ORAL DAILY
Qty: 90 TABLET | Refills: 6 | Status: SHIPPED | OUTPATIENT
Start: 2019-08-19 | End: 2019-11-04 | Stop reason: SDUPTHER

## 2019-08-19 NOTE — ASSESSMENT & PLAN NOTE
Hemoglobin A1c 6.9 sugar 1 36-12 to about possible metformin I do not think she is a big fan of taking medication

## 2019-08-19 NOTE — ASSESSMENT & PLAN NOTE
Controlled on diuretic potassium supplement negative stress echocardiogram November 2018 EKG not done today due to poison ivy on her chest

## 2019-08-19 NOTE — LETTER
August 19, 2019     Jonathan Benoit Sr., DO  [address] [place] [id number]    Patient: Prachi Lopez  YOB: 1950  Date of Visit: 8/19/2019      Dear Dr. Benoit:    Thank you for referring Prachi Lopez to me for evaluation. Below are my notes for this consultation.    If you have questions, please do not hesitate to call me. I look forward to following your patient along with you.         Sincerely,        Jaylen Lynne MD        CC: No Recipients  Jaylen Lynne MD  8/19/2019  9:37 AM  Sign at close encounter      Cardiology Note    Jonathan Benoit Sr.,           Prachi Lopez is a 69 y.o. female   She returns for cardiac follow-up  She told me she had a stroke in 2013 with field cut  She had a implantable monitor that time no atrial arrhythmias are picked up she is treated with statin and antiplatelet  She for hypertension hyperglycemia mixed hyperlipidemia  She had negative stress echocardiogram November 2018              Electronically signed by Jaylen Lynne MD at 11/13/2018  2:47 PM        Office Visit on 11/13/2018            Revision History                            k    Patient Active Problem List    Diagnosis Date Noted   • Hyperglycemia 10/02/2018   • Arthrofibrosis of total knee arthroplasty (CMS/[facility]) ([facility]) 10/02/2018   • Essential hypertension 09/28/2018   • Mixed hyperlipidemia 09/28/2018   • CVA (cerebral vascular accident) (CMS/[facility]) ([facility]) 09/28/2018   • History of loop recorder 09/28/2018       Allergy    No known drug allergies          MED LIST       Current Outpatient Prescriptions   Medication Sig Dispense Refill   • aspirin 81 mg enteric coated tablet Take 81 mg by mouth daily.     • atorvastatin (LIPITOR) 10 mg tablet BRAND NECESSARY ONE A DAY 90 tablet 6   • chlorthalidone (HYGROTEN) 25 mg tablet Take 1 tablet (25 mg total) by mouth daily. 90 tablet 6   • potassium chloride 20 mEq packet Take 20 mEq by mouth daily. 90 packet 6     No current facility-administered  medications for this visit.                 Review of Systems   Constitution: Negative for malaise/fatigue, weight gain and weight loss.   HENT: Negative for hearing loss and hoarse voice.    Eyes: Negative for visual disturbance.   Cardiovascular: Negative for chest pain, claudication, cyanosis, dyspnea on exertion, irregular heartbeat, leg swelling, near-syncope, orthopnea, palpitations, paroxysmal nocturnal dyspnea and syncope.   Respiratory: Negative for cough, hemoptysis, shortness of breath, sleep disturbances due to breathing, snoring, sputum production and wheezing.    Endocrine: Negative for cold intolerance and heat intolerance.   Hematologic/Lymphatic: Negative.  Negative for bleeding problem. Does not bruise/bleed easily.   Skin: Negative.  Negative for rash.   Musculoskeletal: Positive for joint pain. Negative for arthritis, falls, muscle cramps and myalgias.   Gastrointestinal: Negative for abdominal pain, anorexia, change in bowel habit, constipation, diarrhea, dysphagia, heartburn, jaundice and nausea.   Genitourinary: Negative for frequency and nocturia.   Neurological: Negative for dizziness, focal weakness, headaches, light-headedness, numbness, tremors and vertigo.   Psychiatric/Behavioral: Negative for memory loss. The patient does not have insomnia and is not nervous/anxious.    Allergic/Immunologic: Negative for hives.       Labs   Lab Results   Component Value Date    WBC 9.1 08/05/2019    HGB 12.7 08/05/2019    HCT 39.5 08/05/2019     08/05/2019    CHOL 114 08/05/2019    TRIG 85 08/05/2019    HDL 43 (L) 08/05/2019    LDLCALC 54 08/05/2019    ALT 9 08/05/2019    AST 14 08/05/2019     08/05/2019    K 4.2 08/05/2019     08/05/2019    CREATININE 0.88 08/05/2019    BUN 15 08/05/2019    CO2 36 (H) 08/05/2019    TSH 4.45 08/05/2019    HGBA1C 6.9 (H) 08/05/2019       Lab Results   Component Value Date    GLUCOSE 136 (H) 08/05/2019    CALCIUM 9.4 08/05/2019     08/05/2019     "K 4.2 08/05/2019    CO2 36 (H) 08/05/2019     08/05/2019    BUN 15 08/05/2019    CREATININE 0.88 08/05/2019         Objective   Vitals:    08/19/19 0904   BP: 124/84   BP Location: Left upper arm   Patient Position: Sitting   Pulse: 82   SpO2: 97%   Weight: 98.4 kg (217 lb)   Height: 1.638 m (5' 4.5\")     Physical Exam   Constitutional: She is oriented to person, place, and time. She appears well-developed and well-nourished. No distress.   Overweight   HENT:   Head: Normocephalic and atraumatic.   Nose: Nose normal.   Eyes: Conjunctivae are normal. No scleral icterus.   Neck: No JVD present.   Cardiovascular: Normal rate, regular rhythm, normal heart sounds and intact distal pulses.  Exam reveals no gallop and no friction rub.    No murmur heard.  Pulmonary/Chest: Effort normal. No stridor. No respiratory distress. She has no wheezes. She has no rales. She exhibits no tenderness.   Abdominal: There is no tenderness.   Musculoskeletal: She exhibits no edema or deformity.   Neurological: She is alert and oriented to person, place, and time.   Skin: Skin is warm and dry.   Psychiatric: She has a normal mood and affect.     ekb 2018 normla    Cardiac Procedures      STRESS  Stress echo 11/18 negaitve mild mr         ELECTROPHYSIOLOGY    2013 IMPLANTABLE DEVICE NO AFIB [facility] P        ECHO   echo 2016 lvh normal ef mac mildmr    Joe  2013 u of p reportedly negative      VASCULAR  vasc mra u of p 2013reportedly neg      EKG      Assessment/Plan:      CVA (cerebral vascular accident) (CMS/HCC) ([facility])  Visual issues in 2013 with a field cut possible including CT angiogram was unremarkable implantable device no atrial arrhythmias remains on antiplatelet and statin    Essential hypertension  Controlled on diuretic potassium supplement negative stress echocardiogram November 2018 EKG not done today due to poison ivy on her chest    Mixed hyperlipidemia  On atorvastatin LDL cholesterol 54 at " goal    Hyperglycemia  Hemoglobin A1c 6.9 sugar 1 36-12 to about possible metformin I do not think she is a big fan of taking medication       \I asked her to talk to you to see if metformin was indicated   I am not sure if she would be willing to take it at this time  Hemoglobin A1c 6.9  She is going to try to diet  I made no changes in her cardiac medications  Follow-up in 6 months with rest echo and carotid duplex    Thank you for allowing me to participate in the care of this patient.  I hope this information is helpful.    Jaylen Lynne MD [facility]   8/19/2019  Cc Jonathan Benoit Sr., DO

## 2019-08-19 NOTE — ASSESSMENT & PLAN NOTE
Continue dapsone.    Visual issues in 2013 with a field cut possible including CT angiogram was unremarkable implantable device no atrial arrhythmias remains on antiplatelet and statin

## 2019-11-04 RX ORDER — CHLORTHALIDONE 25 MG/1
25 TABLET ORAL DAILY
Qty: 90 TABLET | Refills: 2 | Status: SHIPPED | OUTPATIENT
Start: 2019-11-04 | End: 2020-02-25 | Stop reason: SDUPTHER

## 2019-12-13 ENCOUNTER — TELEPHONE (OUTPATIENT)
Dept: SCHEDULING | Facility: CLINIC | Age: 69
End: 2019-12-13

## 2019-12-13 RX ORDER — ATORVASTATIN CALCIUM 10 MG/1
TABLET, FILM COATED ORAL
Qty: 91 TABLET | Refills: 3 | Status: SHIPPED | OUTPATIENT
Start: 2019-12-13 | End: 2020-02-25 | Stop reason: SDUPTHER

## 2019-12-13 NOTE — TELEPHONE ENCOUNTER
Medicine Refill Request    atorvastatin (LIPITOR) 10 mg tablet    Last Office Visit: Visit date not found  Next Office Visit: Visit date not found        Current Outpatient Medications:   •  aspirin 81 mg enteric coated tablet, Take 81 mg by mouth daily., Disp: , Rfl:   •  atorvastatin (LIPITOR) 10 mg tablet, BRAND NECESSARY ONE A DAY, Disp: 90 tablet, Rfl: 6  •  chlorthalidone (HYGROTEN) 25 mg tablet, Take 1 tablet (25 mg total) by mouth daily., Disp: 90 tablet, Rfl: 2  •  potassium chloride 20 mEq packet, Take 20 mEq by mouth daily., Disp: 90 packet, Rfl: 6      BP Readings from Last 3 Encounters:   08/19/19 124/84   11/13/18 120/80   11/13/18 110/78       Recent Lab results:  Lab Results   Component Value Date    CHOL 114 08/05/2019   ,   Lab Results   Component Value Date    HDL 43 (L) 08/05/2019   ,   Lab Results   Component Value Date    LDLCALC 54 08/05/2019   ,   Lab Results   Component Value Date    TRIG 85 08/05/2019        Lab Results   Component Value Date    GLUCOSE 136 (H) 08/05/2019   ,   Lab Results   Component Value Date    HGBA1C 6.9 (H) 08/05/2019         Lab Results   Component Value Date    CREATININE 0.88 08/05/2019       Lab Results   Component Value Date    TSH 4.45 08/05/2019

## 2020-01-15 ENCOUNTER — TELEPHONE (OUTPATIENT)
Dept: CARDIOLOGY | Facility: CLINIC | Age: 70
End: 2020-01-15

## 2020-01-15 RX ORDER — POTASSIUM CHLORIDE 20 MEQ/1
20 TABLET, EXTENDED RELEASE ORAL 2 TIMES DAILY
Qty: 180 TABLET | Refills: 1 | Status: SHIPPED | OUTPATIENT
Start: 2020-01-15 | End: 2020-01-16 | Stop reason: SDUPTHER

## 2020-01-15 RX ORDER — POTASSIUM CHLORIDE 1.5 G/1.58G
20 POWDER, FOR SOLUTION ORAL DAILY
Qty: 90 PACKET | Refills: 6 | Status: SHIPPED | OUTPATIENT
Start: 2020-01-15 | End: 2020-01-15

## 2020-01-15 NOTE — TELEPHONE ENCOUNTER
[facility] calling to confirm change for potassium chloride 20 mEq packet. Per pharmacy, pt normally takes tablets.     Please contact pharmacy to confirm.   [id number] p-[phone]

## 2020-01-16 RX ORDER — POTASSIUM CHLORIDE 20 MEQ/1
20 TABLET, EXTENDED RELEASE ORAL DAILY
Qty: 90 TABLET | Refills: 1 | Status: SHIPPED | OUTPATIENT
Start: 2020-01-16 | End: 2020-02-25 | Stop reason: SDUPTHER

## 2020-01-16 NOTE — TELEPHONE ENCOUNTER
Pt picked up her new script of potassium and it says take it twice a day, pt used to only take it one a day.  Pt would like to confirm if the twice a day is a new instruction.  Pt can be reached at [phone].

## 2020-01-16 NOTE — TELEPHONE ENCOUNTER
Dr Lynne  Routing to you for clarification on dosing  I am unable to locate any documentation in chart r/t increased dose    Please advise

## 2020-02-03 ENCOUNTER — TELEPHONE (OUTPATIENT)
Dept: SCHEDULING | Facility: CLINIC | Age: 70
End: 2020-02-03

## 2020-02-03 DIAGNOSIS — R73.9 HYPERGLYCEMIA: ICD-10-CM

## 2020-02-03 DIAGNOSIS — I10 ESSENTIAL HYPERTENSION: ICD-10-CM

## 2020-02-03 DIAGNOSIS — T84.82XS ARTHROFIBROSIS OF TOTAL KNEE REPLACEMENT, SEQUELA: ICD-10-CM

## 2020-02-03 DIAGNOSIS — I63.89 CEREBROVASCULAR ACCIDENT (CVA) DUE TO OTHER MECHANISM: Primary | ICD-10-CM

## 2020-02-03 DIAGNOSIS — E78.2 MIXED HYPERLIPIDEMIA: ICD-10-CM

## 2020-02-19 ENCOUNTER — TELEPHONE (OUTPATIENT)
Dept: CARDIOLOGY | Facility: CLINIC | Age: 70
End: 2020-02-19

## 2020-02-19 NOTE — TELEPHONE ENCOUNTER
I called pt to confirm Carotid/Echo/ OV -- 2/25/2020- She will be here.    Pt state she had labs done at [facility] on 2/18/2020 @ 12noon.

## 2020-02-20 LAB
ALBUMIN SERPL-MCNC: 4.3 G/DL (ref 3.6–5.1)
ALBUMIN/GLOB SERPL: 1.5 (CALC) (ref 1–2.5)
ALP SERPL-CCNC: 101 U/L (ref 37–153)
ALT SERPL-CCNC: 10 U/L (ref 6–29)
AST SERPL-CCNC: 20 U/L (ref 10–35)
BASOPHILS # BLD AUTO: 28 CELLS/UL (ref 0–200)
BASOPHILS NFR BLD AUTO: 0.3 %
BILIRUB SERPL-MCNC: 0.6 MG/DL (ref 0.2–1.2)
BUN SERPL-MCNC: 17 MG/DL (ref 7–25)
BUN/CREAT SERPL: ABNORMAL (CALC) (ref 6–22)
CALCIUM SERPL-MCNC: 9.5 MG/DL (ref 8.6–10.4)
CHLORIDE SERPL-SCNC: 98 MMOL/L (ref 98–110)
CHOLEST SERPL-MCNC: 115 MG/DL
CHOLEST/HDLC SERPL: 2.4 (CALC)
CO2 SERPL-SCNC: 30 MMOL/L (ref 20–32)
CREAT SERPL-MCNC: 0.83 MG/DL (ref 0.5–0.99)
EOSINOPHIL # BLD AUTO: 288 CELLS/UL (ref 15–500)
EOSINOPHIL NFR BLD AUTO: 3.1 %
ERYTHROCYTE [DISTWIDTH] IN BLOOD BY AUTOMATED COUNT: 13.1 % (ref 11–15)
GLOBULIN SER CALC-MCNC: 2.8 G/DL (CALC) (ref 1.9–3.7)
GLUCOSE SERPL-MCNC: 131 MG/DL (ref 65–99)
HBA1C MFR BLD: 7.4 % OF TOTAL HGB
HCT VFR BLD AUTO: 40.2 % (ref 35–45)
HDLC SERPL-MCNC: 47 MG/DL
HGB BLD-MCNC: 13.1 G/DL (ref 11.7–15.5)
LDLC SERPL CALC-MCNC: 49 MG/DL (CALC)
LYMPHOCYTES # BLD AUTO: 1469 CELLS/UL (ref 850–3900)
LYMPHOCYTES NFR BLD AUTO: 15.8 %
MCH RBC QN AUTO: 30 PG (ref 27–33)
MCHC RBC AUTO-ENTMCNC: 32.6 G/DL (ref 32–36)
MCV RBC AUTO: 92 FL (ref 80–100)
MONOCYTES # BLD AUTO: 688 CELLS/UL (ref 200–950)
MONOCYTES NFR BLD AUTO: 7.4 %
NEUTROPHILS # BLD AUTO: 6826 CELLS/UL (ref 1500–7800)
NEUTROPHILS NFR BLD AUTO: 73.4 %
NONHDLC SERPL-MCNC: 68 MG/DL (CALC)
PLATELET # BLD AUTO: 311 THOUSAND/UL (ref 140–400)
PMV BLD REES-ECKER: 11.6 FL (ref 7.5–12.5)
POTASSIUM SERPL-SCNC: 3.7 MMOL/L (ref 3.5–5.3)
PROT SERPL-MCNC: 7.1 G/DL (ref 6.1–8.1)
QUEST EGFR NON-AFR. AMERICAN: 72 ML/MIN/1.73M2
RBC # BLD AUTO: 4.37 MILLION/UL (ref 3.8–5.1)
SODIUM SERPL-SCNC: 139 MMOL/L (ref 135–146)
TRIGL SERPL-MCNC: 102 MG/DL
TSH SERPL-ACNC: 4.02 MIU/L (ref 0.4–4.5)
WBC # BLD AUTO: 9.3 THOUSAND/UL (ref 3.8–10.8)

## 2020-02-21 ASSESSMENT — ENCOUNTER SYMPTOMS
WEIGHT GAIN: 0
HOARSE VOICE: 0
DIARRHEA: 0
BRUISES/BLEEDS EASILY: 0
MEMORY LOSS: 0
INSOMNIA: 0
HEMATOLOGIC/LYMPHATIC NEGATIVE: 1
VERTIGO: 0
SHORTNESS OF BREATH: 0
SPUTUM PRODUCTION: 0
TREMORS: 0
HEMOPTYSIS: 0
WEIGHT LOSS: 0
WHEEZING: 0
ORTHOPNEA: 0
COUGH: 0
DYSPNEA ON EXERTION: 0
CHANGE IN BOWEL HABIT: 0
ABDOMINAL PAIN: 0
IRREGULAR HEARTBEAT: 0
MUSCLE CRAMPS: 0
PALPITATIONS: 0
NUMBNESS: 0
FOCAL WEAKNESS: 0
MYALGIAS: 0
CLAUDICATION: 0
NERVOUS/ANXIOUS: 0
SYNCOPE: 0
LIGHT-HEADEDNESS: 0
FREQUENCY: 0
SLEEP DISTURBANCES DUE TO BREATHING: 0
NAUSEA: 0
HEARTBURN: 0
NEAR-SYNCOPE: 0
SNORING: 0
PND: 0
JAUNDICE: 0
CONSTIPATION: 0
ANOREXIA: 0
DIZZINESS: 0
HEADACHES: 0
FALLS: 0

## 2020-02-21 NOTE — PROGRESS NOTES
Cardiology Note    Jonathan Benoit Sr.,           Prachi Lopez is a 69 y.o. female     She returns for follow-up visit echocardiogram carotid duplex images personally reviewed    She has history of a questionable TIA in 2013 presenting with a visual field cut   CT angiogram at that time was unremarkable  She had an implantable device no atrial fibrillation    Carotids today personally reviewed minimal plaque  Echocardiogram today LVH preserved ejection fraction diastolic dysfunction mild MR    She is treated for hypertension mixed hyperlipidemia hyperglycemia  She had negative ischemic work-up stress echo November 2018        Lab work February 2020  Cholesterol goal LDL 49  Glucose elevated 130 with hemoglobin A1c 7.4 creatinine 0.8 potassium 3.7                                  k    Patient Active Problem List    Diagnosis Date Noted   • Hyperglycemia 10/02/2018   • Arthrofibrosis of total knee arthroplasty (CMS/[facility]) 10/02/2018   • Essential hypertension 09/28/2018   • Mixed hyperlipidemia 09/28/2018   • CVA (cerebral vascular accident) (CMS/[facility]) 09/28/2018   • History of loop recorder 09/28/2018       Allergy    No known drug allergies          MED LIST       Current Outpatient Medications   Medication Sig Dispense Refill   • aspirin 81 mg enteric coated tablet Take 81 mg by mouth daily.     • atorvastatin (LIPITOR) 10 mg tablet BRAND NECESSARY ONE A DAY 91 tablet 3   • chlorthalidone (HYGROTEN) 25 mg tablet Take 1 tablet (25 mg total) by mouth daily. BRAND NEEDED 90 tablet 6   • KLOR-CON M20 20 mEq CR tablet Take 1 tablet (20 mEq total) by mouth daily. BRAND NEEDED 90 tablet 1     No current facility-administered medications for this visit.                 Review of Systems   Constitution: Negative for malaise/fatigue, weight gain and weight loss.   HENT: Negative for hearing loss and hoarse voice.    Eyes: Negative for visual disturbance.   Cardiovascular: Negative for chest pain, claudication, cyanosis,  "dyspnea on exertion, irregular heartbeat, leg swelling, near-syncope, orthopnea, palpitations, paroxysmal nocturnal dyspnea and syncope.   Respiratory: Negative for cough, hemoptysis, shortness of breath, sleep disturbances due to breathing, snoring, sputum production and wheezing.    Endocrine: Negative for cold intolerance and heat intolerance.   Hematologic/Lymphatic: Negative.  Negative for bleeding problem. Does not bruise/bleed easily.   Skin: Negative.  Negative for rash.   Musculoskeletal: Positive for joint pain. Negative for arthritis, falls, muscle cramps and myalgias.   Gastrointestinal: Negative for abdominal pain, anorexia, change in bowel habit, constipation, diarrhea, dysphagia, heartburn, jaundice and nausea.   Genitourinary: Negative for frequency and nocturia.   Neurological: Negative for dizziness, focal weakness, headaches, light-headedness, numbness, tremors and vertigo.   Psychiatric/Behavioral: Negative for memory loss. The patient does not have insomnia and is not nervous/anxious.    Allergic/Immunologic: Negative for hives.       Labs   Lab Results   Component Value Date    WBC 9.3 02/19/2020    HGB 13.1 02/19/2020    HCT 40.2 02/19/2020     02/19/2020    CHOL 115 02/19/2020    TRIG 102 02/19/2020    HDL 47 (L) 02/19/2020    LDLCALC 49 02/19/2020    ALT 10 02/19/2020    AST 20 02/19/2020     02/19/2020    K 3.7 02/19/2020    CL 98 02/19/2020    CREATININE 0.83 02/19/2020    BUN 17 02/19/2020    CO2 30 02/19/2020    TSH 4.02 02/19/2020    HGBA1C 7.4 (H) 02/19/2020       Lab Results   Component Value Date    GLUCOSE 131 (H) 02/19/2020    CALCIUM 9.5 02/19/2020     02/19/2020    K 3.7 02/19/2020    CO2 30 02/19/2020    CL 98 02/19/2020    BUN 17 02/19/2020    CREATININE 0.83 02/19/2020         Objective   Vitals:    02/25/20 0813   BP: 120/84   BP Location: Left upper arm   Patient Position: Sitting   Pulse: 80   SpO2: 97%   Weight: 96.6 kg (213 lb)   Height: 1.626 m (5' 4\") "     Physical Exam   Constitutional: She is oriented to person, place, and time. She appears well-developed and well-nourished. No distress.   Overweight   HENT:   Head: Normocephalic and atraumatic.   Nose: Nose normal.   Eyes: Conjunctivae are normal. No scleral icterus.   Neck: No JVD present.   Cardiovascular: Normal rate, regular rhythm, normal heart sounds and intact distal pulses. Exam reveals no gallop and no friction rub.   No murmur heard.  Pulmonary/Chest: Effort normal. No stridor. No respiratory distress. She has no wheezes. She has no rales. She exhibits no tenderness.   Abdominal: There is no tenderness.   Musculoskeletal: She exhibits no edema or deformity.   Neurological: She is alert and oriented to person, place, and time.   Skin: Skin is warm and dry.   Psychiatric: She has a normal mood and affect.      normla    Cardiac Procedures      STRESS        Stress echo 11/18 negaitve mild mr         ELECTROPHYSIOLOGY    2013 IMPLANTABLE DEVICE NO AFIB [facility] P        ECHO   echo 2016 lvh normal ef mac mildmr    Joe  2013 u of p reportedly negative    Echo February 2020 LVH preserved ejection fraction mild MR grade 1 diastolic dysfunction      VASCULAR  vasc mra u of p 2013reportedly neg    Carotid duplex February 2020 minimal plaque              Assessment/Plan:      CVA (cerebral vascular accident) (CMS/[facility])  .  She presented with visual defect in 2013 and work-up negative including implantable cardiac monitor  Carotid duplex today minimal plaque echo LVH otherwise normal  No known obstructive CAD with negative ischemic evaluation November 2018    Mixed hyperlipidemia  Cholesterol at goal on atorvastatin LDL 49    Hyperglycemia  Numbers meet criteria for diabetes with A1c 7.4 suspect she will need pharmaceutical therapy  She will follow-up with you  She is not a big fan of taking medication for this  She brought a couple because she is trying to watch her diet    Essential hypertension  Controlled on  diuretic       Suspect she is going to need something for her elevated sugar she will follow-up with you I will see her back in 1 year with lab work    Thank you for allowing me to participate in the care of this patient.  I hope this information is helpful.    Jaylen Lynne MD [facility]   2/25/2020  Cc Jonathan Benoit Sr., DO

## 2020-02-25 ENCOUNTER — HOSPITAL ENCOUNTER (OUTPATIENT)
Dept: CARDIOLOGY | Facility: CLINIC | Age: 70
Discharge: HOME | End: 2020-02-25
Payer: COMMERCIAL

## 2020-02-25 ENCOUNTER — OFFICE VISIT (OUTPATIENT)
Dept: CARDIOLOGY | Facility: CLINIC | Age: 70
End: 2020-02-25
Payer: COMMERCIAL

## 2020-02-25 VITALS
OXYGEN SATURATION: 97 % | WEIGHT: 213 LBS | SYSTOLIC BLOOD PRESSURE: 120 MMHG | HEIGHT: 64 IN | HEART RATE: 80 BPM | BODY MASS INDEX: 36.37 KG/M2 | DIASTOLIC BLOOD PRESSURE: 84 MMHG

## 2020-02-25 VITALS
SYSTOLIC BLOOD PRESSURE: 120 MMHG | WEIGHT: 217 LBS | BODY MASS INDEX: 37.05 KG/M2 | HEIGHT: 64 IN | DIASTOLIC BLOOD PRESSURE: 80 MMHG

## 2020-02-25 VITALS — BODY MASS INDEX: 37.05 KG/M2 | WEIGHT: 217 LBS | HEIGHT: 64 IN

## 2020-02-25 DIAGNOSIS — R73.9 HYPERGLYCEMIA: ICD-10-CM

## 2020-02-25 DIAGNOSIS — I63.89 CEREBROVASCULAR ACCIDENT (CVA) DUE TO OTHER MECHANISM: ICD-10-CM

## 2020-02-25 DIAGNOSIS — E78.2 MIXED HYPERLIPIDEMIA: Primary | ICD-10-CM

## 2020-02-25 DIAGNOSIS — Z98.890 HISTORY OF LOOP RECORDER: ICD-10-CM

## 2020-02-25 DIAGNOSIS — T84.82XS ARTHROFIBROSIS OF TOTAL KNEE REPLACEMENT, SEQUELA: ICD-10-CM

## 2020-02-25 DIAGNOSIS — I10 ESSENTIAL HYPERTENSION: ICD-10-CM

## 2020-02-25 DIAGNOSIS — E78.2 MIXED HYPERLIPIDEMIA: ICD-10-CM

## 2020-02-25 LAB
AORTIC ROOT ANNULUS: 3 CM
ASCENDING AORTA: 3.3 CM
AV PEAK GRADIENT: 7 MMHG
AV PEAK VELOCITY-S: 1.3 M/S
AV VALVE AREA: 1.99 CM2
BSA FOR ECHO PROCEDURE: 2.03 M2
CUSP SEPARATION: 1.4 CM
E WAVE DECELERATION TIME: 194 MS
E/A RATIO: 0.5
E/E' RATIO: 7.3
E/LAT E' RATIO: 7.2
EDV (BP): 53.5 CM3
EF (A4C): 59.1 %
EF A2C: 51.9 %
EJECTION FRACTION: 56.4 %
EST RIGHT VENT SYSTOLIC PRESSURE BY TRICUSPID REGURGITATION JET: 26 MMHG
ESV (BP): 23.3 CM3
FRACTIONAL SHORTENING: 30.8 %
INTERVENTRICULAR SEPTUM: 1.03 CM
LA ESV (BP): 42.6 CM3
LA ESV INDEX (A2C): 19.36 CM3/M2
LA ESV INDEX (BP): 20.99 CM3/M2
LA/AORTA RATIO: 1.2
LAAS-AP2: 15.1 CM2
LAAS-AP4: 16.4 CM2
LAD 2D: 3.6 CM
LALD A4C: 4.98 CM
LALD A4C: 5.28 CM
LAV-S: 39.3 CM3
LEFT ATRIUM VOLUME INDEX: 21.67 CM3/M2
LEFT ATRIUM VOLUME: 44 CM3
LEFT INTERNAL DIMENSION IN SYSTOLE: 2.67 CM (ref 2.98–4.52)
LEFT VENTRICLE DIASTOLIC VOLUME INDEX: 29.9 CM3/M2
LEFT VENTRICLE DIASTOLIC VOLUME: 60.7 CM3
LEFT VENTRICLE SYSTOLIC VOLUME INDEX: 12.27 CM3/M2
LEFT VENTRICLE SYSTOLIC VOLUME: 24.9 CM3
LEFT VENTRICULAR INTERNAL DIMENSION IN DIASTOLE: 3.86 CM (ref 5.08–7.05)
LEFT VENTRICULAR POSTERIOR WALL IN END DIASTOLE: 0.86 CM (ref 0.65–1.22)
LV DIASTOLIC VOLUME: 45.1 CM3
LV ESV (APICAL 2 CHAMBER): 21.7 CM3
LVAD-AP2: 19.2 CM2
LVAD-AP4: 23.1 CM2
LVAS-AP2: 12.4 CM2
LVAS-AP4: 13.8 CM2
LVEDVI(A2C): 22.22 CM3/M2
LVEDVI(BP): 26.35 CM3/M2
LVESVI(A2C): 10.69 CM3/M2
LVESVI(BP): 11.48 CM3/M2
LVLD-AP2: 6.92 CM
LVLD-AP4: 7.28 CM
LVLS-AP2: 6.19 CM
LVLS-AP4: 6.4 CM
LVOT 2D: 1.8 CM
LVOT A: 2.54 CM2
LVOT PEAK VELOCITY: 1.02 M/S
MV E'TISSUE VEL-LAT: 0.07 M/S
MV E'TISSUE VEL-MED: 0.07 M/S
MV PEAK A VEL: 1.03 M/S
MV PEAK E VEL: 0.52 M/S
POSTERIOR WALL: 0.86 CM
RAP: 5 MMHG
RVOT VMAX: 0.69 M/S
SEPTAL TISSUE DOPPLER FREE WALL LATE DIA VELOCITY (APICAL 4 CHAMBER VIEW): 0.11 M/S
TR MAX PG: 21 MMHG
TRICUSPID VALVE PEAK REGURGITATION VELOCITY: 2.3 M/S
Z-SCORE OF LEFT VENTRICULAR DIMENSION IN END DIASTOLE: -4.38
Z-SCORE OF LEFT VENTRICULAR DIMENSION IN END SYSTOLE: -2.51
Z-SCORE OF LEFT VENTRICULAR POSTERIOR WALL IN END DIASTOLE: -0.19

## 2020-02-25 PROCEDURE — 93880 EXTRACRANIAL BILAT STUDY: CPT | Performed by: INTERNAL MEDICINE

## 2020-02-25 PROCEDURE — 93306 TTE W/DOPPLER COMPLETE: CPT | Performed by: INTERNAL MEDICINE

## 2020-02-25 PROCEDURE — 99214 OFFICE O/P EST MOD 30 MIN: CPT | Performed by: INTERNAL MEDICINE

## 2020-02-25 PROCEDURE — 93000 ELECTROCARDIOGRAM COMPLETE: CPT | Performed by: INTERNAL MEDICINE

## 2020-02-25 RX ORDER — CHLORTHALIDONE 25 MG/1
25 TABLET ORAL DAILY
Qty: 90 TABLET | Refills: 6 | Status: SHIPPED | OUTPATIENT
Start: 2020-02-25 | End: 2020-06-03

## 2020-02-25 RX ORDER — POTASSIUM CHLORIDE 20 MEQ/1
20 TABLET, EXTENDED RELEASE ORAL DAILY
Qty: 90 TABLET | Refills: 1 | Status: SHIPPED | OUTPATIENT
Start: 2020-02-25 | End: 2020-02-25 | Stop reason: SDUPTHER

## 2020-02-25 RX ORDER — ATORVASTATIN CALCIUM 10 MG/1
TABLET, FILM COATED ORAL
Qty: 91 TABLET | Refills: 3 | Status: SHIPPED | OUTPATIENT
Start: 2020-02-25 | End: 2020-02-25 | Stop reason: SDUPTHER

## 2020-02-25 RX ORDER — ATORVASTATIN CALCIUM 10 MG/1
TABLET, FILM COATED ORAL
Qty: 91 TABLET | Refills: 3 | Status: SHIPPED | OUTPATIENT
Start: 2020-02-25 | End: 2020-12-15

## 2020-02-25 RX ORDER — CHLORTHALIDONE 25 MG/1
25 TABLET ORAL DAILY
Qty: 90 TABLET | Refills: 6 | Status: SHIPPED | OUTPATIENT
Start: 2020-02-25 | End: 2020-02-25 | Stop reason: SDUPTHER

## 2020-02-25 RX ORDER — POTASSIUM CHLORIDE 1500 MG/1
20 TABLET, EXTENDED RELEASE ORAL DAILY
Qty: 90 TABLET | Refills: 1 | Status: SHIPPED | OUTPATIENT
Start: 2020-02-25 | End: 2020-07-07 | Stop reason: SDUPTHER

## 2020-02-25 NOTE — ASSESSMENT & PLAN NOTE
.  She presented with visual defect in 2013 and work-up negative including implantable cardiac monitor  Carotid duplex today minimal plaque echo LVH otherwise normal  No known obstructive CAD with negative ischemic evaluation November 2018

## 2020-02-25 NOTE — LETTER
February 25, 2020     Jonathan Benoit Sr., DO  [address] [facility] [address]    Patient: Prachi Lopez  YOB: 1950  Date of Visit: 2/25/2020      Dear Jonathan    Thank you for referring Prachi Lopez to me for evaluation. Below are my notes for this consultation.    If you have questions, please do not hesitate to call me. I look forward to following your patient along with you.         Sincerely,        Jaylen Lynne MD        CC: No Recipients  Jaylen Lynne MD  2/25/2020  9:20 AM  Sign at close encounter      Cardiology Note    Jonathan Benoit Sr.,           Prachi Lopez is a 69 y.o. female     She returns for follow-up visit echocardiogram carotid duplex images personally reviewed    She has history of a questionable TIA in 2013 presenting with a visual field cut   CT angiogram at that time was unremarkable  She had an implantable device no atrial fibrillation    Carotids today personally reviewed minimal plaque  Echocardiogram today LVH preserved ejection fraction diastolic dysfunction mild MR    She is treated for hypertension mixed hyperlipidemia hyperglycemia  She had negative ischemic work-up stress echo November 2018        Lab work February 2020  Cholesterol goal LDL 49  Glucose elevated 130 with hemoglobin A1c 7.4 creatinine 0.8 potassium 3.7                                  k    Patient Active Problem List    Diagnosis Date Noted   • Hyperglycemia 10/02/2018   • Arthrofibrosis of total knee arthroplasty (CMS/[facility]) 10/02/2018   • Essential hypertension 09/28/2018   • Mixed hyperlipidemia 09/28/2018   • CVA (cerebral vascular accident) (CMS/[facility]) 09/28/2018   • History of loop recorder 09/28/2018       Allergy    No known drug allergies          MED LIST       Current Outpatient Medications   Medication Sig Dispense Refill   • aspirin 81 mg enteric coated tablet Take 81 mg by mouth daily.     • atorvastatin (LIPITOR) 10 mg tablet BRAND NECESSARY ONE A DAY 91 tablet 3   • chlorthalidone  (HYGROTEN) 25 mg tablet Take 1 tablet (25 mg total) by mouth daily. BRAND NEEDED 90 tablet 6   • KLOR-CON M20 20 mEq CR tablet Take 1 tablet (20 mEq total) by mouth daily. BRAND NEEDED 90 tablet 1     No current facility-administered medications for this visit.                 Review of Systems   Constitution: Negative for malaise/fatigue, weight gain and weight loss.   HENT: Negative for hearing loss and hoarse voice.    Eyes: Negative for visual disturbance.   Cardiovascular: Negative for chest pain, claudication, cyanosis, dyspnea on exertion, irregular heartbeat, leg swelling, near-syncope, orthopnea, palpitations, paroxysmal nocturnal dyspnea and syncope.   Respiratory: Negative for cough, hemoptysis, shortness of breath, sleep disturbances due to breathing, snoring, sputum production and wheezing.    Endocrine: Negative for cold intolerance and heat intolerance.   Hematologic/Lymphatic: Negative.  Negative for bleeding problem. Does not bruise/bleed easily.   Skin: Negative.  Negative for rash.   Musculoskeletal: Positive for joint pain. Negative for arthritis, falls, muscle cramps and myalgias.   Gastrointestinal: Negative for abdominal pain, anorexia, change in bowel habit, constipation, diarrhea, dysphagia, heartburn, jaundice and nausea.   Genitourinary: Negative for frequency and nocturia.   Neurological: Negative for dizziness, focal weakness, headaches, light-headedness, numbness, tremors and vertigo.   Psychiatric/Behavioral: Negative for memory loss. The patient does not have insomnia and is not nervous/anxious.    Allergic/Immunologic: Negative for hives.       Labs   Lab Results   Component Value Date    WBC 9.3 02/19/2020    HGB 13.1 02/19/2020    HCT 40.2 02/19/2020     02/19/2020    CHOL 115 02/19/2020    TRIG 102 02/19/2020    HDL 47 (L) 02/19/2020    LDLCALC 49 02/19/2020    ALT 10 02/19/2020    AST 20 02/19/2020     02/19/2020    K 3.7 02/19/2020    CL 98 02/19/2020    CREATININE  "0.83 02/19/2020    BUN 17 02/19/2020    CO2 30 02/19/2020    TSH 4.02 02/19/2020    HGBA1C 7.4 (H) 02/19/2020       Lab Results   Component Value Date    GLUCOSE 131 (H) 02/19/2020    CALCIUM 9.5 02/19/2020     02/19/2020    K 3.7 02/19/2020    CO2 30 02/19/2020    CL 98 02/19/2020    BUN 17 02/19/2020    CREATININE 0.83 02/19/2020         Objective   Vitals:    02/25/20 0813   BP: 120/84   BP Location: Left upper arm   Patient Position: Sitting   Pulse: 80   SpO2: 97%   Weight: 96.6 kg (213 lb)   Height: 1.626 m (5' 4\")     Physical Exam   Constitutional: She is oriented to person, place, and time. She appears well-developed and well-nourished. No distress.   Overweight   HENT:   Head: Normocephalic and atraumatic.   Nose: Nose normal.   Eyes: Conjunctivae are normal. No scleral icterus.   Neck: No JVD present.   Cardiovascular: Normal rate, regular rhythm, normal heart sounds and intact distal pulses. Exam reveals no gallop and no friction rub.   No murmur heard.  Pulmonary/Chest: Effort normal. No stridor. No respiratory distress. She has no wheezes. She has no rales. She exhibits no tenderness.   Abdominal: There is no tenderness.   Musculoskeletal: She exhibits no edema or deformity.   Neurological: She is alert and oriented to person, place, and time.   Skin: Skin is warm and dry.   Psychiatric: She has a normal mood and affect.      normla    Cardiac Procedures      STRESS        Stress echo 11/18 negaitve mild mr         ELECTROPHYSIOLOGY    2013 IMPLANTABLE DEVICE NO AFIB [facility]        ECHO   echo 2016 lvh normal ef mac mildmr    Joe  2013 u [facility] p reportedly negative    Echo February 2020 LVH preserved ejection fraction mild MR grade 1 diastolic dysfunction      VASCULAR  vasc mra u of p 2013reportedly neg    Carotid duplex February 2020 minimal plaque              Assessment/Plan:      CVA (cerebral vascular accident) (CMS/HCC)  .  She presented with visual defect in 2013 and work-up negative " including implantable cardiac monitor  Carotid duplex today minimal plaque echo LVH otherwise normal  No known obstructive CAD with negative ischemic evaluation November 2018    Mixed hyperlipidemia  Cholesterol at goal on atorvastatin LDL 49    Hyperglycemia  Numbers meet criteria for diabetes with A1c 7.4 suspect she will need pharmaceutical therapy  She will follow-up with you  She is not a big fan of taking medication for this  She brought a couple because she is trying to watch her diet    Essential hypertension  Controlled on diuretic       Suspect she is going to need something for her elevated sugar she will follow-up with you I will see her back in 1 year with lab work    Thank you for allowing me to participate in the care of this patient.  I hope this information is helpful.    Jaylen Lynne MD [facility]   2/25/2020  Cc Jonathan Benoit Sr., DO

## 2020-02-25 NOTE — ASSESSMENT & PLAN NOTE
Numbers meet criteria for diabetes with A1c 7.4 suspect she will need pharmaceutical therapy  She will follow-up with you  She is not a big fan of taking medication for this  She brought a couple because she is trying to watch her diet

## 2020-02-26 LAB
BSA FOR ECHO PROCEDURE: 2.11 M2
LEFT CCA DIST DIAS: 18.6 CM/S
LEFT CCA DIST SYS: 53 CM/S
LEFT CCA MID DIAS: 21.1 CM/S
LEFT CCA MID SYS: 62.8 CM/S
LEFT CCA PROX DIAS: 22.4 CM/S
LEFT CCA PROX SYS: 87.4 CM/S
LEFT ICA DIST DIAS: 29.6 CM/S
LEFT ICA DIST SYS: 77.3 CM/S
LEFT ICA MID DIAS: 36.8 CM/S
LEFT ICA MID SYS: 90.1 CM/S
LEFT ICA PROX DIAS: 14.1 CM/S
LEFT ICA PROX SYS: 34 CM/S
LEFT ICA/CCA SYS: 1.7
LT BULB EDV: 8.7 CM/S
LT BULB PSV: 22 CM/S
LT ECA PROX EDV: 8.7 CM/S
LT ECA PROX PSV: 56.7 CM/S
LT VERTEBRAL MID EDV: 12 CM/S
LT VERTEBRAL MID PSV: 45 CM/S
RIGHT CCA DIST DIAS: 21.6 CM/S
RIGHT CCA DIST SYS: 59.7 CM/S
RIGHT CCA MID DIAS: 19.3 CM/S
RIGHT CCA MID SYS: 60.3 CM/S
RIGHT CCA PROX DIAS: 23.6 CM/S
RIGHT CCA PROX SYS: 74.6 CM/S
RIGHT ECA SYS: 58.6 CM/S
RIGHT ICA DIST DIAS: 23.6 CM/S
RIGHT ICA DIST SYS: 57.4 CM/S
RIGHT ICA MID DIAS: 20.2 CM/S
RIGHT ICA MID SYS: 57.1 CM/S
RIGHT ICA PROX DIAS: 22 CM/S
RIGHT ICA PROX SYS: 52.2 CM/S
RIGHT ICA/CCA SYS: 0.96
RT BULB EDV: 13.4 CM/S
RT BULB PSV: 35.1 CM/S
RT ECA PROC EDV: 16.8 CM/S
RT VERTEBRAL MID EDV: 9 CM/S
RT VERTEBRAL MID PSV: 32 CM/S

## 2020-06-03 RX ORDER — CHLORTHALIDONE 25 MG/1
25 TABLET ORAL
Qty: 90 TABLET | Refills: 2 | Status: SHIPPED | OUTPATIENT
Start: 2020-06-03 | End: 2021-04-20

## 2020-07-07 RX ORDER — POTASSIUM CHLORIDE 1500 MG/1
TABLET, EXTENDED RELEASE ORAL
Qty: 180 TABLET | Refills: 1 | OUTPATIENT
Start: 2020-07-07

## 2020-07-07 RX ORDER — POTASSIUM CHLORIDE 1500 MG/1
20 TABLET, EXTENDED RELEASE ORAL DAILY
Qty: 90 TABLET | Refills: 1 | Status: SHIPPED | OUTPATIENT
Start: 2020-07-07 | End: 2020-12-16

## 2020-12-02 ENCOUNTER — TELEPHONE (OUTPATIENT)
Dept: SCHEDULING | Facility: CLINIC | Age: 70
End: 2020-12-02

## 2020-12-02 NOTE — TELEPHONE ENCOUNTER
Please mail recent lab slips to Pt's home address on file     Pt can be reached at [phone].

## 2020-12-16 RX ORDER — POTASSIUM CHLORIDE 1500 MG/1
TABLET, EXTENDED RELEASE ORAL
Qty: 90 TABLET | Refills: 1 | Status: SHIPPED | OUTPATIENT
Start: 2020-12-16 | End: 2020-12-17

## 2020-12-17 RX ORDER — POTASSIUM CHLORIDE 1500 MG/1
TABLET, EXTENDED RELEASE ORAL
Qty: 180 TABLET | Refills: 1 | Status: SHIPPED | OUTPATIENT
Start: 2020-12-17 | End: 2021-04-23 | Stop reason: SDUPTHER

## 2021-02-10 NOTE — TELEPHONE ENCOUNTER
Medical Records Request     Name of caller:  Patient    Records being requested: Please Fax Lab Scripts to [facility] please.     Fax number: [phone]     Best contact number: [phone].     TY

## 2021-02-12 ENCOUNTER — TELEPHONE (OUTPATIENT)
Dept: CARDIOLOGY | Facility: CLINIC | Age: 71
End: 2021-02-12

## 2021-02-12 NOTE — TELEPHONE ENCOUNTER
Can you please send a copy of her lab work to her family doctor  Dr. Benoit  Can you call the patient let her know her sugar is too high over 400 and she needs to follow-up with him soon  Her potassium is low be sure she is taking a potassium supplement if she is she needs to take an extra 1 daily

## 2021-02-12 NOTE — TELEPHONE ENCOUNTER
LMOM. Dr. Lynne reviewed your labs.  Sugar is too ig over 400. Need to folow up with PCP soon to get sugars under controll.  Potassium is low. Please take potassium supplement daily and increase to 2 tabs daily.  Any questions call office back. Labs faxed to Dr. Jonathan Benoit.

## 2021-02-12 NOTE — TELEPHONE ENCOUNTER
Called and spoke to Prachi.  Reviewed labs with her.  Emphasized that her sugar was over 400 and she needs to follow up with PCP. She agreed and will make an appointment with PCP. Potassium low and she does take a supplement and with take 2 tablets for next week.  Patient understands. No other questions. Explained that her lab results were faxed to Dr. Jonathan Benoit's office.  She is requesting a copy and I will mail it to her today.  She understands.

## 2021-02-13 LAB
ALBUMIN SERPL-MCNC: 4.5 G/DL (ref 3.6–5.1)
ALBUMIN/GLOB SERPL: 1.5 (CALC) (ref 1–2.5)
ALP SERPL-CCNC: 93 U/L (ref 37–153)
ALT SERPL-CCNC: 16 U/L (ref 6–29)
AST SERPL-CCNC: 20 U/L (ref 10–35)
BASOPHILS # BLD AUTO: 51 CELLS/UL (ref 0–200)
BASOPHILS NFR BLD AUTO: 0.4 %
BILIRUB SERPL-MCNC: 0.8 MG/DL (ref 0.2–1.2)
BUN SERPL-MCNC: 17 MG/DL (ref 7–25)
BUN/CREAT SERPL: 16 (CALC) (ref 6–22)
CALCIUM SERPL-MCNC: 10.1 MG/DL (ref 8.6–10.4)
CHLORIDE SERPL-SCNC: 88 MMOL/L (ref 98–110)
CHOLEST SERPL-MCNC: 139 MG/DL
CHOLEST/HDLC SERPL: 2.9 (CALC)
CO2 SERPL-SCNC: 29 MMOL/L (ref 20–32)
CREAT SERPL-MCNC: 1.06 MG/DL (ref 0.5–0.99)
EOSINOPHIL # BLD AUTO: 114 CELLS/UL (ref 15–500)
EOSINOPHIL NFR BLD AUTO: 0.9 %
ERYTHROCYTE [DISTWIDTH] IN BLOOD BY AUTOMATED COUNT: 12.5 % (ref 11–15)
GLOBULIN SER CALC-MCNC: 3 G/DL (CALC) (ref 1.9–3.7)
GLUCOSE SERPL-MCNC: 435 MG/DL (ref 65–99)
HBA1C MFR BLD: >14 % OF TOTAL HGB
HCT VFR BLD AUTO: 44.3 % (ref 35–45)
HDLC SERPL-MCNC: 48 MG/DL
HGB BLD-MCNC: 14.3 G/DL (ref 11.7–15.5)
LDLC SERPL CALC-MCNC: 66 MG/DL (CALC)
LYMPHOCYTES # BLD AUTO: 1308 CELLS/UL (ref 850–3900)
LYMPHOCYTES NFR BLD AUTO: 10.3 %
MCH RBC QN AUTO: 30.9 PG (ref 27–33)
MCHC RBC AUTO-ENTMCNC: 32.3 G/DL (ref 32–36)
MCV RBC AUTO: 95.7 FL (ref 80–100)
MONOCYTES # BLD AUTO: 775 CELLS/UL (ref 200–950)
MONOCYTES NFR BLD AUTO: 6.1 %
NEUTROPHILS # BLD AUTO: ABNORMAL CELLS/UL (ref 1500–7800)
NEUTROPHILS NFR BLD AUTO: 82.3 %
NONHDLC SERPL-MCNC: 91 MG/DL (CALC)
PLATELET # BLD AUTO: 347 THOUSAND/UL (ref 140–400)
PMV BLD REES-ECKER: 12 FL (ref 7.5–12.5)
POTASSIUM SERPL-SCNC: 3 MMOL/L (ref 3.5–5.3)
PROT SERPL-MCNC: 7.5 G/DL (ref 6.1–8.1)
QUEST EGFR NON-AFR. AMERICAN: 54 ML/MIN/1.73M2
RBC # BLD AUTO: 4.63 MILLION/UL (ref 3.8–5.1)
SODIUM SERPL-SCNC: 135 MMOL/L (ref 135–146)
TRIGL SERPL-MCNC: 169 MG/DL
TSH SERPL-ACNC: 4.1 MIU/L (ref 0.4–4.5)
WBC # BLD AUTO: 12.7 THOUSAND/UL (ref 3.8–10.8)

## 2021-04-20 ENCOUNTER — TELEPHONE (OUTPATIENT)
Dept: CARDIOLOGY | Facility: CLINIC | Age: 71
End: 2021-04-20

## 2021-04-20 RX ORDER — CHLORTHALIDONE 25 MG/1
25 TABLET ORAL DAILY
Qty: 90 TABLET | Refills: 0 | Status: SHIPPED | OUTPATIENT
Start: 2021-04-20 | End: 2021-04-23 | Stop reason: SDUPTHER

## 2021-04-20 NOTE — TELEPHONE ENCOUNTER
Please call pt to schedule a follow up appt for future refills. Pt was last seen 2/2020. Rx refill sent in today.

## 2021-04-20 NOTE — TELEPHONE ENCOUNTER
Spoke to pt.  Yearly visit.  She is coming in on Friday, April 23 for ov appt to continue to renew labs.  She has recent lab results from 02/2021.

## 2021-04-23 ENCOUNTER — OFFICE VISIT (OUTPATIENT)
Dept: CARDIOLOGY | Facility: CLINIC | Age: 71
End: 2021-04-23
Payer: COMMERCIAL

## 2021-04-23 ENCOUNTER — TELEPHONE (OUTPATIENT)
Dept: CARDIOLOGY | Facility: CLINIC | Age: 71
End: 2021-04-23

## 2021-04-23 VITALS
OXYGEN SATURATION: 99 % | HEART RATE: 103 BPM | DIASTOLIC BLOOD PRESSURE: 76 MMHG | BODY MASS INDEX: 28.14 KG/M2 | WEIGHT: 158.8 LBS | SYSTOLIC BLOOD PRESSURE: 110 MMHG | HEIGHT: 63 IN

## 2021-04-23 DIAGNOSIS — I63.89 CEREBROVASCULAR ACCIDENT (CVA) DUE TO OTHER MECHANISM: ICD-10-CM

## 2021-04-23 DIAGNOSIS — I10 ESSENTIAL HYPERTENSION: Primary | ICD-10-CM

## 2021-04-23 DIAGNOSIS — E11.65 UNCONTROLLED TYPE 2 DIABETES MELLITUS WITH HYPERGLYCEMIA (CMS/HCC): ICD-10-CM

## 2021-04-23 DIAGNOSIS — E78.2 MIXED HYPERLIPIDEMIA: ICD-10-CM

## 2021-04-23 PROBLEM — R73.9 HYPERGLYCEMIA: Status: RESOLVED | Noted: 2018-10-02 | Resolved: 2021-04-23

## 2021-04-23 PROCEDURE — 3008F BODY MASS INDEX DOCD: CPT | Performed by: INTERNAL MEDICINE

## 2021-04-23 PROCEDURE — 3078F DIAST BP <80 MM HG: CPT | Performed by: INTERNAL MEDICINE

## 2021-04-23 PROCEDURE — 99214 OFFICE O/P EST MOD 30 MIN: CPT | Performed by: INTERNAL MEDICINE

## 2021-04-23 PROCEDURE — 93000 ELECTROCARDIOGRAM COMPLETE: CPT | Performed by: INTERNAL MEDICINE

## 2021-04-23 PROCEDURE — 3074F SYST BP LT 130 MM HG: CPT | Performed by: INTERNAL MEDICINE

## 2021-04-23 RX ORDER — ATORVASTATIN CALCIUM 10 MG/1
TABLET, FILM COATED ORAL
Qty: 90 TABLET | Refills: 4 | Status: SHIPPED | OUTPATIENT
Start: 2021-04-23 | End: 2021-10-29 | Stop reason: SDUPTHER

## 2021-04-23 RX ORDER — METFORMIN HYDROCHLORIDE 500 MG/1
500 TABLET ORAL 2 TIMES DAILY WITH MEALS
COMMUNITY
Start: 2021-04-17 | End: 2023-11-02 | Stop reason: SDUPTHER

## 2021-04-23 RX ORDER — POTASSIUM CHLORIDE 20 MEQ/1
40 TABLET, EXTENDED RELEASE ORAL 2 TIMES DAILY
Qty: 90 TABLET | Refills: 3 | Status: SHIPPED | OUTPATIENT
Start: 2021-04-23 | End: 2021-04-23 | Stop reason: SDUPTHER

## 2021-04-23 RX ORDER — ATORVASTATIN CALCIUM 10 MG/1
TABLET, FILM COATED ORAL
Qty: 90 TABLET | Refills: 4 | Status: SHIPPED
Start: 2021-04-23 | End: 2021-04-23 | Stop reason: SDUPTHER

## 2021-04-23 RX ORDER — ATORVASTATIN CALCIUM 10 MG/1
TABLET, FILM COATED ORAL
Qty: 90 TABLET | Refills: 4 | Status: SHIPPED | OUTPATIENT
Start: 2021-04-23 | End: 2021-04-23 | Stop reason: SDUPTHER

## 2021-04-23 RX ORDER — CHLORTHALIDONE 25 MG/1
25 TABLET ORAL DAILY
Qty: 90 TABLET | Refills: 0 | Status: SHIPPED | OUTPATIENT
Start: 2021-04-23 | End: 2021-04-23 | Stop reason: SDUPTHER

## 2021-04-23 RX ORDER — CHLORTHALIDONE 25 MG/1
25 TABLET ORAL DAILY
Qty: 90 TABLET | Refills: 6 | Status: SHIPPED | OUTPATIENT
Start: 2021-04-23 | End: 2021-10-06

## 2021-04-23 RX ORDER — POTASSIUM CHLORIDE 20 MEQ/1
40 TABLET, EXTENDED RELEASE ORAL 2 TIMES DAILY
Qty: 90 TABLET | Refills: 3 | Status: SHIPPED | OUTPATIENT
Start: 2021-04-23 | End: 2021-07-19

## 2021-04-23 ASSESSMENT — ENCOUNTER SYMPTOMS
INSOMNIA: 0
FREQUENCY: 0
COUGH: 0
MUSCLE CRAMPS: 0
HOARSE VOICE: 0
NEAR-SYNCOPE: 0
MEMORY LOSS: 0
JAUNDICE: 0
WEIGHT LOSS: 0
HEARTBURN: 0
TREMORS: 0
BRUISES/BLEEDS EASILY: 0
SLEEP DISTURBANCES DUE TO BREATHING: 0
PALPITATIONS: 0
ORTHOPNEA: 0
SYNCOPE: 0
CHANGE IN BOWEL HABIT: 0
IRREGULAR HEARTBEAT: 0
NUMBNESS: 0
HEMOPTYSIS: 0
DYSPNEA ON EXERTION: 0
SNORING: 0
DIARRHEA: 0
VERTIGO: 0
ABDOMINAL PAIN: 0
CONSTIPATION: 0
ANOREXIA: 0
MYALGIAS: 0
DIZZINESS: 0
PND: 0
FOCAL WEAKNESS: 0
WHEEZING: 0
LIGHT-HEADEDNESS: 0
NAUSEA: 0
HEMATOLOGIC/LYMPHATIC NEGATIVE: 1
FALLS: 0
NERVOUS/ANXIOUS: 0
SPUTUM PRODUCTION: 0
WEIGHT GAIN: 0
HEADACHES: 0
SHORTNESS OF BREATH: 0
CLAUDICATION: 0

## 2021-04-23 NOTE — TELEPHONE ENCOUNTER
Pt of Dr. Lynne- Pharmacy calling to clarify if chlorthalidone is brand needed. States pt has gotten generic chlorthalidone not Hygroten in past?     Please advise. Uses [facility] pharmacy [phone]

## 2021-04-23 NOTE — ASSESSMENT & PLAN NOTE
"Questionable remote CVA with visual defect receiving [facility] in 2013 - work-up  Excluding PFO and had implantable device at that time no A. fib remains on aspirin felt to be due to \"small vessel disease at the time related to diabetes and hypertension  "

## 2021-04-23 NOTE — TELEPHONE ENCOUNTER
Called [facility] and spoke to Shamika.  Dr. Lynne said generic is fine for chlorthalidone.  She understands.  No other questions.

## 2021-04-23 NOTE — ASSESSMENT & PLAN NOTE
You recently started on Metformin sugar was 400 follows up with you for repeat sugars, she lost 30 pounds

## 2021-04-23 NOTE — PROGRESS NOTES
Cardiology Note    Jonathan Benoit Sr.,           Prachi Lopez is a 70 y.o. female     She returns for cardiac follow-up  She has a History of remote possible CVA in 2013 with negative work-up including implantable cardiac monitor at [facility]  She has mild cerebrovascular disease with carotid plaque duplex February 2020    She has mixed hyperlipidemia hyperglycemia hypertension  Last lab work I had from February to be called her back with a sugar of 400   You have started her Metformin and she has lost about 30 pounds  Her last ischemic evaluation was negative in 2018 with stress echocardiogram  She has no cardiovascular complaints        Lab work from February glucose was 400  Creatinine 1  Potassium 3  Cholesterol LDL 66 triglycerides 169  Hemoglobin 12.7  Hem a1c 7.4                                            k    Patient Active Problem List    Diagnosis Date Noted   • Uncontrolled type 2 diabetes mellitus with hyperglycemia (CMS/[facility]) 04/23/2021   • Arthrofibrosis of total knee arthroplasty (CMS/[facility]) 10/02/2018   • Essential hypertension 09/28/2018   • Mixed hyperlipidemia 09/28/2018   • CVA (cerebral vascular accident) (CMS/[facility]) 09/28/2018   • History of loop recorder 09/28/2018       Allergy    No known drug allergies          MED LIST       Current Outpatient Medications   Medication Sig Dispense Refill   • aspirin 81 mg enteric coated tablet Take 81 mg by mouth daily.     • atorvastatin (LIPITOR) 10 mg tablet TAKE 1 TABLET BY MOUTH ONCE DAILY 90 tablet 4   • chlorthalidone (HYGROTEN) 25 mg tablet Take 1 tablet (25 mg total) by mouth daily. Brand Needed. 90 tablet 0   • metFORMIN (GLUCOPHAGE) 1,000 mg tablet 2 (two) times a day.     • potassium chloride (KLOR-CON M20) 20 mEq CR tablet Take 2 tablets (40 mEq total) by mouth 2 (two) times a day. 90 tablet 3     No current facility-administered medications for this visit.                Review of Systems   Constitutional: Negative for  malaise/fatigue, weight gain and weight loss.   HENT: Negative for hearing loss and hoarse voice.    Eyes: Negative for visual disturbance.   Cardiovascular: Negative for chest pain, claudication, cyanosis, dyspnea on exertion, irregular heartbeat, leg swelling, near-syncope, orthopnea, palpitations, paroxysmal nocturnal dyspnea and syncope.   Respiratory: Negative for cough, hemoptysis, shortness of breath, sleep disturbances due to breathing, snoring, sputum production and wheezing.    Endocrine: Negative for cold intolerance and heat intolerance.   Hematologic/Lymphatic: Negative.  Negative for bleeding problem. Does not bruise/bleed easily.   Skin: Negative.  Negative for rash.   Musculoskeletal: Positive for joint pain. Negative for arthritis, falls, muscle cramps and myalgias.   Gastrointestinal: Negative for abdominal pain, anorexia, change in bowel habit, constipation, diarrhea, dysphagia, heartburn, jaundice and nausea.   Genitourinary: Negative for frequency and nocturia.   Neurological: Negative for dizziness, focal weakness, headaches, light-headedness, numbness, tremors and vertigo.   Psychiatric/Behavioral: Negative for memory loss. The patient does not have insomnia and is not nervous/anxious.    Allergic/Immunologic: Negative for hives.       Labs   Lab Results   Component Value Date    WBC 12.7 (H) 02/11/2021    HGB 14.3 02/11/2021    HCT 44.3 02/11/2021     02/11/2021    CHOL 139 02/11/2021    TRIG 169 (H) 02/11/2021    HDL 48 (L) 02/11/2021    LDLCALC 66 02/11/2021    ALT 16 02/11/2021    AST 20 02/11/2021     02/11/2021    K 3.0 (L) 02/11/2021    CL 88 (L) 02/11/2021    CREATININE 1.06 (H) 02/11/2021    BUN 17 02/11/2021    CO2 29 02/11/2021    TSH 4.10 02/11/2021    HGBA1C >14.0 (H) 02/11/2021       Lab Results   Component Value Date    GLUCOSE 435 (H) 02/11/2021    CALCIUM 10.1 02/11/2021     02/11/2021    K 3.0 (L) 02/11/2021    CO2 29 02/11/2021    CL 88 (L) 02/11/2021     "BUN 17 02/11/2021    CREATININE 1.06 (H) 02/11/2021         Objective   Vitals:    04/23/21 1306   BP: 110/76   BP Location: Left upper arm   Patient Position: Sitting   Pulse: (!) 103   SpO2: 99%   Weight: 72 kg (158 lb 12.8 oz)   Height: 1.6 m (5' 3\")     Physical Exam  Constitutional:       General: She is not in acute distress.     Appearance: She is well-developed.      Comments: Overweight   HENT:      Head: Normocephalic and atraumatic.      Nose: Nose normal.   Eyes:      General: No scleral icterus.     Conjunctiva/sclera: Conjunctivae normal.   Neck:      Vascular: No JVD.   Cardiovascular:      Rate and Rhythm: Normal rate and regular rhythm.      Pulses: Intact distal pulses.      Heart sounds: Normal heart sounds. No murmur heard.   No friction rub. No gallop.    Pulmonary:      Effort: Pulmonary effort is normal. No respiratory distress.      Breath sounds: No stridor. No wheezing or rales.   Chest:      Chest wall: No tenderness.   Abdominal:      Tenderness: There is no abdominal tenderness.   Musculoskeletal:         General: No deformity.   Skin:     General: Skin is warm and dry.   Neurological:      Mental Status: She is alert and oriented to person, place, and time.         ekg April 2021 normal   normla    Cardiac Procedures      STRESS        Stress echo   11/18 negaitve mild mr         ELECTROPHYSIOLOGY    2013 IMPLANTABLE DEVICE NO AFIB [facility]        ECHO   echo 2016 lvh normal ef mac mildmr    Joe  2013 u of p reportedly negative    Echo February 2020 LVH preserved ejection fraction mild MR grade 1 diastolic dysfunction      VASCULAR  vasc mra u of p 2013reportedly neg    Carotid duplex February 2020 minimal plaque              Assessment/Plan:      Uncontrolled type 2 diabetes mellitus with hyperglycemia (CMS/HCC)  You recently started on Metformin sugar was 400 follows up with you for repeat sugars, she lost 30 pounds    Mixed hyperlipidemia  On atorvastatin LDL cholesterol " "66    Essential hypertension  Controlled on diuretics    CVA (cerebral vascular accident) (CMS/HCC)  Questionable remote CVA with visual defect receiving [facility] in 2013 - work-up  Excluding PFO and had implantable device at that time no A. job remains on aspirin felt to be due to \"small vessel disease at the time related to diabetes and hypertension      I made no changes to her medication she is newly on Metformin  I will see her back in 6 months she needs an ischemic evaluation she wants to try the treadmill and concerned about arthritis but she wanted to give it a shot so we will do stress echocardiogram  I will see her at that time lab work prior to visit                    Thank you for allowing me to participate in the care of this patient.  I hope this information is helpful.    Jaylen Lynne MD [facility]   4/23/2021  Cc Jonathan Benoit Sr., DO  "

## 2021-04-30 ENCOUNTER — TELEPHONE (OUTPATIENT)
Dept: SCHEDULING | Facility: CLINIC | Age: 71
End: 2021-04-30

## 2021-04-30 NOTE — TELEPHONE ENCOUNTER
LMOM. Dr. Lynne wants you to take 2 pills two times a day ad that is how it was ordered. Tablets may look different as they may be from a different manufacture.

## 2021-05-03 NOTE — TELEPHONE ENCOUNTER
Spoke with patient. She is concerned she is taking potassium 20 meq 2 pills =40meq  Bid. States having some diarrhea     She is asking for call back to  thank you

## 2021-05-03 NOTE — TELEPHONE ENCOUNTER
Called and spoke to Prachi.  She is not sure if diarrhea is from Potassium or Metformin.  She will decrease the potassium to once a day to see if diarrhea subsides. She will delia back in a couple of days to let us know.

## 2021-05-28 ENCOUNTER — TELEPHONE (OUTPATIENT)
Dept: SCHEDULING | Facility: CLINIC | Age: 71
End: 2021-05-28

## 2021-05-28 NOTE — TELEPHONE ENCOUNTER
Pt state she was recently diagnosis with diabetes and would like to know if she can drink fresca soda While taking atorvastatin    Pt state to leave a message if she's unable to pick     Pt can be reached at [phone]

## 2021-05-28 NOTE — TELEPHONE ENCOUNTER
Called and spoke to Prachi.  Dr. Lynne says as long as it is diet soda you are ok.  She understands.   No questions.

## 2021-09-15 ENCOUNTER — TELEPHONE (OUTPATIENT)
Dept: CARDIOLOGY | Facility: CLINIC | Age: 71
End: 2021-09-15

## 2021-10-06 RX ORDER — CHLORTHALIDONE 25 MG/1
25 TABLET ORAL DAILY
Qty: 90 TABLET | Refills: 6 | Status: SHIPPED | OUTPATIENT
Start: 2021-10-06 | End: 2021-10-29 | Stop reason: SDUPTHER

## 2021-10-11 ENCOUNTER — TELEPHONE (OUTPATIENT)
Dept: SCHEDULING | Facility: CLINIC | Age: 71
End: 2021-10-11

## 2021-10-11 DIAGNOSIS — E11.65 UNCONTROLLED TYPE 2 DIABETES MELLITUS WITH HYPERGLYCEMIA (CMS/HCC): ICD-10-CM

## 2021-10-11 DIAGNOSIS — R73.9 HYPERGLYCEMIA: ICD-10-CM

## 2021-10-11 DIAGNOSIS — E78.2 MIXED HYPERLIPIDEMIA: ICD-10-CM

## 2021-10-11 DIAGNOSIS — I10 ESSENTIAL HYPERTENSION: Primary | ICD-10-CM

## 2021-10-11 DIAGNOSIS — I63.9 CEREBROVASCULAR ACCIDENT (CVA), UNSPECIFIED MECHANISM (CMS/HCC): ICD-10-CM

## 2021-10-11 NOTE — TELEPHONE ENCOUNTER
Pt calling to request lab scripts. There are no current ones on file. Pt states she gets a complete work up at [facility].     She would also like scripts mailed to her home and not sent to [facility].     Pt can be reached at [phone]

## 2021-10-20 LAB
ALBUMIN SERPL-MCNC: 4.5 G/DL (ref 3.6–5.1)
ALBUMIN/GLOB SERPL: 1.5 (CALC) (ref 1–2.5)
ALP SERPL-CCNC: 81 U/L (ref 37–153)
ALT SERPL-CCNC: 6 U/L (ref 6–29)
AST SERPL-CCNC: 14 U/L (ref 10–35)
BASOPHILS # BLD AUTO: 36 CELLS/UL (ref 0–200)
BASOPHILS NFR BLD AUTO: 0.4 %
BILIRUB SERPL-MCNC: 0.7 MG/DL (ref 0.2–1.2)
BUN SERPL-MCNC: 19 MG/DL (ref 7–25)
BUN/CREAT SERPL: ABNORMAL (CALC) (ref 6–22)
CALCIUM SERPL-MCNC: 9.8 MG/DL (ref 8.6–10.4)
CHLORIDE SERPL-SCNC: 99 MMOL/L (ref 98–110)
CHOLEST SERPL-MCNC: 127 MG/DL
CHOLEST/HDLC SERPL: 2.3 (CALC)
CO2 SERPL-SCNC: 32 MMOL/L (ref 20–32)
CREAT SERPL-MCNC: 0.79 MG/DL (ref 0.6–0.93)
EOSINOPHIL # BLD AUTO: 164 CELLS/UL (ref 15–500)
EOSINOPHIL NFR BLD AUTO: 1.8 %
ERYTHROCYTE [DISTWIDTH] IN BLOOD BY AUTOMATED COUNT: 12.2 % (ref 11–15)
GLOBULIN SER CALC-MCNC: 3.1 G/DL (CALC) (ref 1.9–3.7)
GLUCOSE SERPL-MCNC: 108 MG/DL (ref 65–99)
HBA1C MFR BLD: 5.7 % OF TOTAL HGB
HCT VFR BLD AUTO: 40.3 % (ref 35–45)
HDLC SERPL-MCNC: 56 MG/DL
HGB BLD-MCNC: 13 G/DL (ref 11.7–15.5)
LDLC SERPL CALC-MCNC: 53 MG/DL (CALC)
LYMPHOCYTES # BLD AUTO: 1374 CELLS/UL (ref 850–3900)
LYMPHOCYTES NFR BLD AUTO: 15.1 %
MCH RBC QN AUTO: 30.7 PG (ref 27–33)
MCHC RBC AUTO-ENTMCNC: 32.3 G/DL (ref 32–36)
MCV RBC AUTO: 95 FL (ref 80–100)
MONOCYTES # BLD AUTO: 573 CELLS/UL (ref 200–950)
MONOCYTES NFR BLD AUTO: 6.3 %
NEUTROPHILS # BLD AUTO: 6952 CELLS/UL (ref 1500–7800)
NEUTROPHILS NFR BLD AUTO: 76.4 %
NONHDLC SERPL-MCNC: 71 MG/DL (CALC)
PLATELET # BLD AUTO: 312 THOUSAND/UL (ref 140–400)
PMV BLD REES-ECKER: 11.8 FL (ref 7.5–12.5)
POTASSIUM SERPL-SCNC: 3.2 MMOL/L (ref 3.5–5.3)
PROT SERPL-MCNC: 7.6 G/DL (ref 6.1–8.1)
QUEST EGFR AFRICAN AMERICAN: 88 ML/MIN/1.73M2
QUEST EGFR NON-AFR. AMERICAN: 76 ML/MIN/1.73M2
RBC # BLD AUTO: 4.24 MILLION/UL (ref 3.8–5.1)
SODIUM SERPL-SCNC: 141 MMOL/L (ref 135–146)
TRIGL SERPL-MCNC: 92 MG/DL
TSH SERPL-ACNC: 2.64 MIU/L (ref 0.4–4.5)
WBC # BLD AUTO: 9.1 THOUSAND/UL (ref 3.8–10.8)

## 2021-10-23 ASSESSMENT — ENCOUNTER SYMPTOMS
NAUSEA: 0
VERTIGO: 0
WEIGHT GAIN: 0
WHEEZING: 0
TREMORS: 0
DIZZINESS: 0
ABDOMINAL PAIN: 0
INSOMNIA: 0
SNORING: 0
LIGHT-HEADEDNESS: 0
HEMOPTYSIS: 0
SPUTUM PRODUCTION: 0
NERVOUS/ANXIOUS: 0
NUMBNESS: 0
ORTHOPNEA: 0
DYSPNEA ON EXERTION: 0
MEMORY LOSS: 0
CHANGE IN BOWEL HABIT: 0
CLAUDICATION: 0
CONSTIPATION: 0
HEARTBURN: 0
COUGH: 0
SHORTNESS OF BREATH: 0
NEAR-SYNCOPE: 0
ANOREXIA: 0
SYNCOPE: 0
IRREGULAR HEARTBEAT: 0
SLEEP DISTURBANCES DUE TO BREATHING: 0
HOARSE VOICE: 0
MYALGIAS: 0
PND: 0
BRUISES/BLEEDS EASILY: 0
JAUNDICE: 0
DIARRHEA: 0
FREQUENCY: 0
PALPITATIONS: 0
FOCAL WEAKNESS: 0
HEMATOLOGIC/LYMPHATIC NEGATIVE: 1
WEIGHT LOSS: 0
HEADACHES: 0
MUSCLE CRAMPS: 0
FALLS: 0

## 2021-10-23 NOTE — PROGRESS NOTES
Cardiology Note    Jonathan Benoit Sr.,           Prachi Lopez is a 71 y.o. female     She returns for cardiac follow-up and stress echocardiogram I had wanted her to have either coronary CT angiogram or pharmacologic nuclear stress test but she wanted to try the treadmill is concerned about her being able to negotiate this with her arthritis  Stress echocardiogram today October 2021 mild mitral regurgitation negative for ischemia    she has diabetes not well controlled recently started on Metformin   she has been dieting and lost 30 pounds mixed hyperlipidemia hypertension repeat lab work much improved  History of remote CVA in 2013 with a negative work-up negative for PFO   she had an implantable device at that time did not show any A. Fib  Lab work are much improved reviewed   remarkable for hypokalemia cholesterol at goal    Has only been taking only on potassium a day  Take 2 a day    Lab work October 2021  Glucose 108  Creatinine 0.79  Potassium 3.2  CBC normal  Cholesterol 127 HDL 56 LDL 53  Hemoglobin A1c 5.7 down fr              k    Patient Active Problem List    Diagnosis Date Noted   • Uncontrolled type 2 diabetes mellitus with hyperglycemia (CMS/[facility]) 04/23/2021   • Arthrofibrosis of total knee arthroplasty (CMS/[facility]) 10/02/2018   • Essential hypertension 09/28/2018   • Mixed hyperlipidemia 09/28/2018   • CVA (cerebral vascular accident) (CMS/[facility]) 09/28/2018   • History of loop recorder 09/28/2018       Allergy    No known drug allergies          MED LIST       Current Outpatient Medications   Medication Sig Dispense Refill   • aspirin 81 mg enteric coated tablet Take 81 mg by mouth daily.     • atorvastatin 10 mg tablet TAKE 1 TABLET BY MOUTH ONCE DAILY 90 tablet 4   • chlorthalidone 25 mg tablet Take 1 tablet (25 mg total) by mouth daily. Brand Needed. 90 tablet 6   • metFORMIN 500 mg tablet 500 mg 2 (two) times a day with meals.       • potassium chloride (KLOR-CON M20) 20 mEq CR tablet Take 1  tablet (20 mEq total) by mouth 2 (two) times a day. 360 tablet 3     No current facility-administered medications for this visit.                Review of Systems   Constitutional: Negative for malaise/fatigue, weight gain and weight loss.   HENT: Negative for hearing loss and hoarse voice.    Eyes: Negative for visual disturbance.   Cardiovascular: Negative for chest pain, claudication, cyanosis, dyspnea on exertion, irregular heartbeat, leg swelling, near-syncope, orthopnea, palpitations, paroxysmal nocturnal dyspnea and syncope.   Respiratory: Negative for cough, hemoptysis, shortness of breath, sleep disturbances due to breathing, snoring, sputum production and wheezing.    Endocrine: Negative for cold intolerance and heat intolerance.   Hematologic/Lymphatic: Negative.  Negative for bleeding problem. Does not bruise/bleed easily.   Skin: Negative.  Negative for rash.   Musculoskeletal: Positive for joint pain. Negative for arthritis, falls, muscle cramps and myalgias.   Gastrointestinal: Negative for abdominal pain, anorexia, change in bowel habit, constipation, diarrhea, dysphagia, heartburn, jaundice and nausea.   Genitourinary: Negative for frequency and nocturia.   Neurological: Negative for dizziness, focal weakness, headaches, light-headedness, numbness, tremors and vertigo.   Psychiatric/Behavioral: Negative for memory loss. The patient does not have insomnia and is not nervous/anxious.    Allergic/Immunologic: Negative for hives.       Labs   Lab Results   Component Value Date    WBC 9.1 10/19/2021    HGB 13.0 10/19/2021    HCT 40.3 10/19/2021     10/19/2021    CHOL 127 10/19/2021    TRIG 92 10/19/2021    HDL 56 10/19/2021    LDLCALC 53 10/19/2021    ALT 6 10/19/2021    AST 14 10/19/2021     10/19/2021    K 3.2 (L) 10/19/2021    CL 99 10/19/2021    CREATININE 0.79 10/19/2021    BUN 19 10/19/2021    CO2 32 10/19/2021    TSH 2.64 10/19/2021    HGBA1C 5.7 (H) 10/19/2021       Lab Results  "  Component Value Date    GLUCOSE 108 (H) 10/19/2021    CALCIUM 9.8 10/19/2021     10/19/2021    K 3.2 (L) 10/19/2021    CO2 32 10/19/2021    CL 99 10/19/2021    BUN 19 10/19/2021    CREATININE 0.79 10/19/2021         Objective   Vitals:    10/29/21 1024   BP: 114/66   BP Location: Right upper arm   Patient Position: Standing   Pulse: 78   SpO2: 97%   Weight: 74.9 kg (165 lb 3.2 oz)   Height: 1.626 m (5' 4\")     Physical Exam  Constitutional:       General: She is not in acute distress.     Appearance: She is well-developed.      Comments: Overweight   HENT:      Head: Normocephalic and atraumatic.      Nose: Nose normal.   Eyes:      General: No scleral icterus.     Conjunctiva/sclera: Conjunctivae normal.   Neck:      Vascular: No JVD.   Cardiovascular:      Rate and Rhythm: Normal rate and regular rhythm.      Pulses: Intact distal pulses.      Heart sounds: Normal heart sounds. No murmur heard.  No friction rub. No gallop.    Pulmonary:      Effort: Pulmonary effort is normal. No respiratory distress.      Breath sounds: No stridor. No wheezing or rales.   Chest:      Chest wall: No tenderness.   Abdominal:      Tenderness: There is no abdominal tenderness.   Musculoskeletal:         General: No deformity.   Skin:     General: Skin is warm and dry.   Neurological:      Mental Status: She is alert and oriented to person, place, and time.         ekg April 2021 normal   normla    Cardiac Procedures      STRESS        Stress echo   11/18 negaitve mild mr     Stress echo oct 2021  Neg mild mr        ELECTROPHYSIOLOGY    2013 IMPLANTABLE DEVICE NO AFIB [facility]        ECHO   echo 2016 l[facility] normal ef mac mildmr    Joe  2013 [facility] reportedly negative    Echo February 2020 LVH preserved ejection fraction mild MR grade 1 diastolic dysfunction      VASCULAR  vasc mra [facility] p 2013reportedly neg    Carotid duplex February 2020 minimal plaque              Assessment/Plan:      Essential hypertension  Controlled on " chlorthalidone potassium is low she has not been taking enough only taking it once a day increased to twice a day 20 mEq stress echocardiogram today October 2021 - for ischemia we talked about coronary CT angiogram she declined    Mixed hyperlipidemia  LDL cholesterol at goal 53, on atorvastatin    Uncontrolled type 2 diabetes mellitus with hyperglycemia (CMS/[facility])  Much improved with diet and Metformin hemoglobin A1c 5.7    CVA (cerebral vascular accident) (CMS/[facility])  Questionable diagnosis in 2013 questionable field cut work-up at that time no A. fib no PFO remains on aspirin and statin therapy            Follow-up of hypertension mixed hyperlipidemia diabetes  Questionable remote stroke, unclear  No known obstructive CAD with negative stress echocardiogram today October 2021    Changes made today was increase her potassium supplement she follows up with me regularly for her lab work I will see her back in 1 year        Thank you for allowing me to participate in the care of this patient.  I hope this information is helpful.    Jaylen Lynne MD [facility]   10/29/2021  Cc Jonathan Benoit Sr., DO

## 2021-10-29 ENCOUNTER — HOSPITAL ENCOUNTER (OUTPATIENT)
Dept: CARDIOLOGY | Facility: CLINIC | Age: 71
Discharge: HOME | End: 2021-10-29
Payer: COMMERCIAL

## 2021-10-29 ENCOUNTER — OFFICE VISIT (OUTPATIENT)
Dept: CARDIOLOGY | Facility: CLINIC | Age: 71
End: 2021-10-29
Payer: COMMERCIAL

## 2021-10-29 VITALS
HEIGHT: 64 IN | DIASTOLIC BLOOD PRESSURE: 66 MMHG | OXYGEN SATURATION: 97 % | BODY MASS INDEX: 28.2 KG/M2 | HEART RATE: 78 BPM | SYSTOLIC BLOOD PRESSURE: 114 MMHG | WEIGHT: 165.2 LBS

## 2021-10-29 VITALS
HEIGHT: 63 IN | WEIGHT: 158 LBS | SYSTOLIC BLOOD PRESSURE: 120 MMHG | BODY MASS INDEX: 28 KG/M2 | DIASTOLIC BLOOD PRESSURE: 80 MMHG

## 2021-10-29 DIAGNOSIS — I10 ESSENTIAL HYPERTENSION: ICD-10-CM

## 2021-10-29 DIAGNOSIS — E78.2 MIXED HYPERLIPIDEMIA: ICD-10-CM

## 2021-10-29 DIAGNOSIS — E11.65 UNCONTROLLED TYPE 2 DIABETES MELLITUS WITH HYPERGLYCEMIA (CMS/HCC): Primary | ICD-10-CM

## 2021-10-29 DIAGNOSIS — I63.89 CEREBROVASCULAR ACCIDENT (CVA) DUE TO OTHER MECHANISM: ICD-10-CM

## 2021-10-29 LAB
AORTIC ROOT ANNULUS: 3.1 CM
ASCENDING AORTA: 3.4 CM
AV PEAK GRADIENT: 7 MMHG
AV PEAK VELOCITY-S: 1.36 M/S
AV VALVE AREA: 2.65 CM2
BSA FOR ECHO PROCEDURE: 1.78 M2
E WAVE DECELERATION TIME: 215 MS
E/A RATIO: 0.6
E/E' RATIO: 10
E/LAT E' RATIO: 8.3
EDV (BP): 57.3 CM3
EF (A4C): 64.8 %
EF A2C: 61.5 %
EJECTION FRACTION: 62.8 %
ESV (BP): 21.3 CM3
LA ESV (BP): 44.7 CM3
LA ESV INDEX (A2C): 26.18 CM3/M2
LA ESV INDEX (BP): 25.11 CM3/M2
LA/AORTA RATIO: 1.19
LAAS-AP2: 17 CM2
LAAS-AP4: 16.7 CM2
LAD 2D: 3.7 CM
LALD A4C: 5.16 CM
LALD A4C: 5.23 CM
LAV-S: 46.6 CM3
LEFT ATRIUM VOLUME INDEX: 24.1 CM3/M2
LEFT ATRIUM VOLUME: 42.9 CM3
LEFT VENTRICLE DIASTOLIC VOLUME INDEX: 37.02 CM3/M2
LEFT VENTRICLE DIASTOLIC VOLUME: 65.9 CM3
LEFT VENTRICLE SYSTOLIC VOLUME INDEX: 13.03 CM3/M2
LEFT VENTRICLE SYSTOLIC VOLUME: 23.2 CM3
LV DIASTOLIC VOLUME: 49.4 CM3
LV ESV (APICAL 2 CHAMBER): 19 CM3
LVAD-AP2: 20.7 CM2
LVAD-AP4: 23.8 CM2
LVAS-AP2: 11.5 CM2
LVAS-AP4: 12.7 CM2
LVEDVI(A2C): 27.75 CM3/M2
LVEDVI(BP): 32.19 CM3/M2
LVESVI(A2C): 10.67 CM3/M2
LVESVI(BP): 11.97 CM3/M2
LVLD-AP2: 7.33 CM
LVLD-AP4: 7.26 CM
LVLS-AP2: 6.08 CM
LVLS-AP4: 5.84 CM
LVOT 2D: 1.9 CM
LVOT A: 2.84 CM2
LVOT PEAK VELOCITY: 1.27 M/S
MV E'TISSUE VEL-LAT: 0.08 M/S
MV E'TISSUE VEL-MED: 0.06 M/S
MV PEAK A VEL: 1.1 M/S
MV PEAK E VEL: 0.64 M/S
PV PEAK GRADIENT: 6 MMHG
PV PV: 1.22 M/S
RVOT VMAX: 0.76 M/S
RVOT VTI: 17.9 CM
STRESS BASELINE BP: NORMAL MMHG
STRESS BASELINE HR: 85 BPM
STRESS ECHO POST RECOVERY HR: 82 BPM
STRESS O2 SAT REST: 97 %
STRESS PERCENT HR: 89 %
STRESS POST ESTIMATED WORKLOAD: 4.5 METS
STRESS POST EXERCISE DUR MIN: 4 MIN
STRESS POST EXERCISE DUR SEC: 6 SEC
STRESS POST O2 SAT PEAK: 97 %
STRESS POST PEAK BP: NORMAL MMHG
STRESS POST PEAK HR: 134 BPM
STRESS TARGET HR: 128 BPM
TR MAX PG: 23 MMHG
TRICUSPID VALVE PEAK REGURGITATION VELOCITY: 2.39 M/S
TV REST PULMONARY ARTERY PRESSURE: 28 MMHG

## 2021-10-29 PROCEDURE — 3078F DIAST BP <80 MM HG: CPT | Performed by: INTERNAL MEDICINE

## 2021-10-29 PROCEDURE — 3008F BODY MASS INDEX DOCD: CPT | Performed by: INTERNAL MEDICINE

## 2021-10-29 PROCEDURE — 93351 STRESS TTE COMPLETE: CPT | Performed by: INTERNAL MEDICINE

## 2021-10-29 PROCEDURE — 3074F SYST BP LT 130 MM HG: CPT | Performed by: INTERNAL MEDICINE

## 2021-10-29 PROCEDURE — 99214 OFFICE O/P EST MOD 30 MIN: CPT | Performed by: INTERNAL MEDICINE

## 2021-10-29 RX ORDER — CHLORTHALIDONE 25 MG/1
25 TABLET ORAL DAILY
Qty: 90 TABLET | Refills: 6 | Status: SHIPPED | OUTPATIENT
Start: 2021-10-29 | End: 2022-11-01 | Stop reason: SDUPTHER

## 2021-10-29 RX ORDER — ATORVASTATIN CALCIUM 10 MG/1
TABLET, FILM COATED ORAL
Qty: 90 TABLET | Refills: 4 | Status: SHIPPED | OUTPATIENT
Start: 2021-10-29 | End: 2022-04-11 | Stop reason: SDUPTHER

## 2021-10-29 RX ORDER — POTASSIUM CHLORIDE 20 MEQ/1
20 TABLET, EXTENDED RELEASE ORAL 2 TIMES DAILY
Qty: 360 TABLET | Refills: 3 | Status: SHIPPED | OUTPATIENT
Start: 2021-10-29 | End: 2022-11-01 | Stop reason: SDUPTHER

## 2021-10-29 NOTE — ASSESSMENT & PLAN NOTE
Questionable diagnosis in 2013 questionable field cut work-up at that time no A. fib no PFO remains on aspirin and statin therapy

## 2021-10-29 NOTE — ASSESSMENT & PLAN NOTE
Controlled on chlorthalidone potassium is low she has not been taking enough only taking it once a day increased to twice a day 20 mEq stress echocardiogram today October 2021 - for ischemia we talked about coronary CT angiogram she declined

## 2021-12-22 NOTE — LETTER
October 2, 2018     Jonathan Benoit Sr.,   [address] [facility] [address]    Patient: Prachi Lopez   YOB: 1950   Date of Visit: 10/2/2018       Dear Jonathan  Thank you for referring Prachi Lopez to me for evaluation. Below are my notes for this consultation.    If you have questions, please do not hesitate to call me. I look forward to following your patient along with you.         Sincerely,        Jaylen Lynne MD        CC: No Recipients  Jaylen Lynne MD  10/2/2018  1:38 PM  Sign at close encounter  Cardiology Consult/New Patient    Jonathan Benoit Sr.,           Prachi Lopez is a 68 y.o. female identifies as who presents with     She is here to establish cardiac care  She is a history of what sounds like a TIA with visual field cut back in 2013  She was evaluated [facility]  They recommended a long-term monitor she did have an implantable device no episodes of atrial fibrillation were picked up at that time  No history of vascular disease from what she tells me  He remains on blood pressure medications statin and aspirin no recurrence  No known obstructive CAD no chest pain mild shortness of breath with exertion    Patient Active Problem List    Diagnosis Date Noted   • Hyperglycemia 10/02/2018   • Essential hypertension 09/28/2018   • Mixed hyperlipidemia 09/28/2018   • CVA (cerebral vascular accident) (CMS/HCC) (HCC) 09/28/2018   • History of loop recorder 09/28/2018       Medical History:   Past Medical History:   Diagnosis Date   • Hyperlipidemia    • Hypertension    • Stroke (CMS/HCC) ([facility])        Surgical History:   Past Surgical History:   Procedure Laterality Date   • JOINT REPLACEMENT         Allergies: No known drug allergies    Current Outpatient Prescriptions   Medication Sig Dispense Refill   • aspirin 81 mg enteric coated tablet Take 81 mg by mouth daily.     • atorvastatin (LIPITOR) 10 mg tablet Take 1 tablet (10 mg total) by mouth daily. 90 tablet 5   •  chlorthalidone (HYGROTEN) 25 mg tablet Take 1 tablet (25 mg total) by mouth daily. 90 tablet 5   • potassium chloride 20 mEq packet Take 20 mEq by mouth daily.       No current facility-administered medications for this visit.        Social History:   Social History     Social History   • Marital status:      Spouse name: N/A   • Number of children: N/A   • Years of education: N/A     Social History Main Topics   • Smoking status: Never Smoker   • Smokeless tobacco: Never Used   • Alcohol use No   • Drug use: Unknown   • Sexual activity: Not Asked     Other Topics Concern   • None     Social History Narrative   • None       Family History:   Family History   Problem Relation Age of Onset   • Heart disease Mother    • Heart disease Father        Review of Systems   Review of Systems   Constitution: Negative for malaise/fatigue, weight gain and weight loss.   HENT: Negative for hearing loss and hoarse voice.    Eyes: Negative for visual disturbance.   Cardiovascular: Negative for chest pain, claudication, cyanosis, dyspnea on exertion, irregular heartbeat, leg swelling, near-syncope, orthopnea, palpitations, paroxysmal nocturnal dyspnea and syncope.   Respiratory: Negative for cough, hemoptysis, shortness of breath, sleep disturbances due to breathing, snoring, sputum production and wheezing.    Endocrine: Negative for cold intolerance and heat intolerance.   Hematologic/Lymphatic: Negative.  Negative for bleeding problem. Does not bruise/bleed easily.   Skin: Negative.  Negative for rash.   Musculoskeletal: Positive for joint pain. Negative for arthritis, falls, muscle cramps and myalgias.   Gastrointestinal: Negative for abdominal pain, anorexia, change in bowel habit, constipation, diarrhea, dysphagia, heartburn, jaundice and nausea.   Genitourinary: Negative for frequency and nocturia.   Neurological: Negative for dizziness, focal weakness, headaches, light-headedness, numbness, tremors and vertigo.    Psychiatric/Behavioral: Negative for memory loss. The patient does not have insomnia and is not nervous/anxious.    Allergic/Immunologic: Negative for hives.       Objective       Vitals:    10/02/18 1312   BP: 118/76   Pulse: 84   SpO2: 95%       Physical Exam   Constitutional: She is oriented to person, place, and time. She appears well-developed and well-nourished. No distress.   Overweight   HENT:   Head: Normocephalic and atraumatic.   Nose: Nose normal.   Eyes: Conjunctivae are normal. No scleral icterus.   Neck: No JVD present.   Cardiovascular: Normal rate, regular rhythm, normal heart sounds and intact distal pulses.  Exam reveals no gallop and no friction rub.    No murmur heard.  Pulmonary/Chest: Effort normal. No stridor. No respiratory distress. She has no wheezes. She has no rales. She exhibits no tenderness.   Abdominal: There is no tenderness.   Musculoskeletal: She exhibits no edema or deformity.   Neurological: She is alert and oriented to person, place, and time.   Skin: Skin is warm and dry.   Psychiatric: She has a normal mood and affect.        Labs   No results found for: WBC, HGB, HCT, PLT, CHOL, TRIG, HDL, LDLDIRECT, ALT, AST, NA, K, CL, CREATININE, BUN, CO2, TSH, PSA, INR, HGBA1C, MICROALBUR    Imaging      ECG   sinus rhythm nsr non spec st t chagnes      Assessment/Plan     CVA (cerebral vascular accident) (CMS/HCC) ([facility])  In 2013, she had a visual field cut and a stroke evaluate [facility] placed on medical therapy no vascular disease identified he had a long-term implantable monitor that was negative for atrial fibrillation she has not followed up with him in a years no clinical recurrence    Essential hypertension  Controlled with diuretic and potassium supplement  Plan ischemic evaluation, stress echo    Hyperglycemia  She was told her sugar was elevated in the past recheck numbers    Mixed hyperlipidemia  On low-dose statin will be aggressive if not at goal with her  history of stroke       See her back after lab work and at time of stress echocardiogram  She has a knee replacement, we will see how she does on the treadmill does not do well plan pharmacologic nuclear stress    Jaylen Lynne MD  10/2/2018              Detail Level: Zone

## 2022-04-11 RX ORDER — ATORVASTATIN CALCIUM 10 MG/1
TABLET, FILM COATED ORAL
Qty: 90 TABLET | Refills: 3 | Status: SHIPPED | OUTPATIENT
Start: 2022-04-11 | End: 2022-11-01 | Stop reason: SDUPTHER

## 2022-04-11 NOTE — TELEPHONE ENCOUNTER
Medicine Refill Request  [facility] states no refill left Pt needs atorvastatin 10 mg tablet,      Current Outpatient Medications:   •  aspirin 81 mg enteric coated tablet, Take 81 mg by mouth daily., Disp: , Rfl:   •  atorvastatin 10 mg tablet, TAKE 1 TABLET BY MOUTH ONCE DAILY, Disp: 90 tablet, Rfl: 4  •  chlorthalidone 25 mg tablet, Take 1 tablet (25 mg total) by mouth daily. Brand Needed., Disp: 90 tablet, Rfl: 6  •  metFORMIN 500 mg tablet, 500 mg 2 (two) times a day with meals.  , Disp: , Rfl:   •  potassium chloride (KLOR-CON M20) 20 mEq CR tablet, Take 1 tablet (20 mEq total) by mouth 2 (two) times a day., Disp: 360 tablet, Rfl: 3      BP Readings from Last 3 Encounters:   10/29/21 120/80   10/29/21 114/66   04/23/21 110/76       Recent Lab results:  Lab Results   Component Value Date    CHOL 127 10/19/2021   ,   Lab Results   Component Value Date    HDL 56 10/19/2021   ,   Lab Results   Component Value Date    LDLCALC 53 10/19/2021   ,   Lab Results   Component Value Date    TRIG 92 10/19/2021        Lab Results   Component Value Date    GLUCOSE 108 (H) 10/19/2021   ,   Lab Results   Component Value Date    HGBA1C 5.7 (H) 10/19/2021         Lab Results   Component Value Date    CREATININE 0.79 10/19/2021       Lab Results   Component Value Date    TSH 2.64 10/19/2021

## 2022-09-15 NOTE — TELEPHONE ENCOUNTER
Start Keflex 500mg PO BID for 7 days. Recommend warm compresses BID OU. RTC if worsening of symptoms. Stress Echo- 11/13/18- IJEOMA Dos Santos

## 2022-09-27 ENCOUNTER — TELEPHONE (OUTPATIENT)
Dept: SCHEDULING | Facility: CLINIC | Age: 72
End: 2022-09-27
Payer: COMMERCIAL

## 2022-09-27 NOTE — TELEPHONE ENCOUNTER
Medical Records Request     Name of caller: Prachi    Relationship to patient: self    Whos requesting copy of medical records? pt    Records being requested: blood work requested by Dr Lynne    Please mail script to pt's home    Best contact number: [phone]

## 2022-10-24 ASSESSMENT — ENCOUNTER SYMPTOMS
MEMORY LOSS: 0
CHANGE IN BOWEL HABIT: 0
SPUTUM PRODUCTION: 0
WEIGHT LOSS: 0
PND: 0
FOCAL WEAKNESS: 0
WHEEZING: 0
ORTHOPNEA: 0
CONSTIPATION: 0
SNORING: 0
DYSPNEA ON EXERTION: 0
VERTIGO: 0
HEMATOLOGIC/LYMPHATIC NEGATIVE: 1
NEAR-SYNCOPE: 0
NERVOUS/ANXIOUS: 0
BRUISES/BLEEDS EASILY: 0
CLAUDICATION: 0
NUMBNESS: 0
COUGH: 0
SYNCOPE: 0
JAUNDICE: 0
FREQUENCY: 0
SHORTNESS OF BREATH: 0
ANOREXIA: 0
WEIGHT GAIN: 0
MUSCLE CRAMPS: 0
INSOMNIA: 0
LIGHT-HEADEDNESS: 0
HEADACHES: 0
MYALGIAS: 0
FALLS: 0
DIARRHEA: 0
ABDOMINAL PAIN: 0
IRREGULAR HEARTBEAT: 0
SLEEP DISTURBANCES DUE TO BREATHING: 0
HEMOPTYSIS: 0
DIZZINESS: 0
NAUSEA: 0
PALPITATIONS: 0
TREMORS: 0
HOARSE VOICE: 0
HEARTBURN: 0

## 2022-10-24 NOTE — PROGRESS NOTES
Cardiology Note    Jonathan Benoit Sr.,           Prachi Lopez is a 72 y.o. female   She returns for cardiac follow-up she is treated for   Hypertension  hyperlipidemia type   2 diabetes   questional history of remote CVA in 2013 with field cut work-up at that [facility]  Work-up at that time no evidence of atrial arrhythmias or PFO   she remains treated with aspirin and statin therapy   no known obstructive CAD with negative stress echo October 2021   we have spoken about a coronary CT angiogram but she had declined in the past  Lab work at goal mild hyperglycemia mild leukocytosis    Lab work October 2022  LDL cholesterol 57  Glucose 116  Creatinine 0.87 potassium 4  Hemoglobin A1c 5.8  White count 10.9 top normal                            k    Patient Active Problem List    Diagnosis Date Noted    Controlled type 2 diabetes mellitus with complication, without long-term current use of insulin (CMS/HCC) 04/23/2021    Arthrofibrosis of total knee arthroplasty (CMS/HCC) 10/02/2018    Essential hypertension 09/28/2018    Mixed hyperlipidemia 09/28/2018    Diabetes mellitus type II, non insulin dependent (CMS/HCC) 09/28/2018    CVA (cerebral vascular accident) (CMS/HCC) 09/28/2018    History of loop recorder 09/28/2018       Allergy    No known drug allergies          MED LIST       Current Outpatient Medications   Medication Sig Dispense Refill    aspirin 81 mg enteric coated tablet Take 81 mg by mouth daily.      atorvastatin (LIPITOR) 10 mg tablet TAKE 1 TABLET BY MOUTH ONCE DAILY 90 tablet 3    chlorthalidone (HYGROTEN) 25 mg tablet Take 1 tablet (25 mg total) by mouth daily. Brand Needed. 90 tablet 6    metFORMIN 500 mg tablet 500 mg 2 (two) times a day with meals.        ONETOUCH DELICA PLUS LANCET 33 gauge misc 2 (two) times a day. for testing      potassium chloride (KLOR-CON M20) 20 mEq CR tablet Take 1 tablet (20 mEq total) by mouth 2 (two) times a day. 360 tablet 3     No current  facility-administered medications for this visit.                Review of Systems   Constitutional: Negative for malaise/fatigue, weight gain and weight loss.   HENT: Negative for hearing loss and hoarse voice.    Eyes: Negative for visual disturbance.   Cardiovascular: Negative for chest pain, claudication, cyanosis, dyspnea on exertion, irregular heartbeat, leg swelling, near-syncope, orthopnea, palpitations, paroxysmal nocturnal dyspnea and syncope.   Respiratory: Negative for cough, hemoptysis, shortness of breath, sleep disturbances due to breathing, snoring, sputum production and wheezing.    Endocrine: Negative for cold intolerance and heat intolerance.   Hematologic/Lymphatic: Negative.  Negative for bleeding problem. Does not bruise/bleed easily.   Skin: Negative.  Negative for rash.   Musculoskeletal: Positive for joint pain. Negative for arthritis, falls, muscle cramps and myalgias.   Gastrointestinal: Negative for abdominal pain, anorexia, change in bowel habit, constipation, diarrhea, dysphagia, heartburn, jaundice and nausea.   Genitourinary: Negative for frequency and nocturia.   Neurological: Negative for dizziness, focal weakness, headaches, light-headedness, numbness, tremors and vertigo.   Psychiatric/Behavioral: Negative for memory loss. The patient does not have insomnia and is not nervous/anxious.    Allergic/Immunologic: Negative for hives.       Labs   Lab Results   Component Value Date    WBC 10.9 (H) 10/25/2022    HGB 12.5 10/25/2022    HCT 38.5 10/25/2022     10/25/2022    CHOL 130 10/25/2022    TRIG 81 10/25/2022    HDL 57 10/25/2022    LDLCALC 57 10/25/2022    ALT 7 10/25/2022    AST 14 10/25/2022     10/25/2022    K 4.0 10/25/2022     10/25/2022    CREATININE 0.87 10/25/2022    BUN 26 (H) 10/25/2022    CO2 30 10/25/2022    TSH 2.64 10/19/2021    HGBA1C 5.8 (H) 10/25/2022       Lab Results   Component Value Date    GLUCOSE 116 (H) 10/25/2022    CALCIUM 9.6 10/25/2022  "    10/25/2022    K 4.0 10/25/2022    CO2 30 10/25/2022     10/25/2022    BUN 26 (H) 10/25/2022    CREATININE 0.87 10/25/2022         Objective   Vitals:    11/01/22 0752   BP: 126/80   BP Location: Left upper arm   Patient Position: Sitting   Pulse: (!) 107   Resp: 16   SpO2: 98%   Weight: 86.6 kg (191 lb)   Height: 1.6 m (5' 3\")     Physical Exam  Constitutional:       General: She is not in acute distress.     Appearance: She is well-developed.      Comments: Overweight   HENT:      Head: Normocephalic and atraumatic.      Nose: Nose normal.   Eyes:      General: No scleral icterus.     Conjunctiva/sclera: Conjunctivae normal.   Neck:      Vascular: No JVD.   Cardiovascular:      Rate and Rhythm: Normal rate and regular rhythm.      Pulses: Intact distal pulses.      Heart sounds: Normal heart sounds. No murmur heard.    No friction rub. No gallop.   Pulmonary:      Effort: Pulmonary effort is normal. No respiratory distress.      Breath sounds: No stridor. No wheezing or rales.   Chest:      Chest wall: No tenderness.   Abdominal:      Tenderness: There is no abdominal tenderness.   Musculoskeletal:         General: No deformity.   Skin:     General: Skin is warm and dry.   Neurological:      Mental Status: She is alert and oriented to person, place, and time.         Oct 2022          Cardiac Procedures      STRESS        Stress echo   11/18 negaitve mild mr     Stress echo oct 2021  Neg mild mr        ELECTROPHYSIOLOGY    2013 IMPLANTABLE DEVICE NO AFIB [facility]        ECHO   echo 2016 [facility] normal ef mac mildmr    Joe  2013 [facility] p reportedly negative    Echo February 2020 LVH preserved ejection fraction mild MR grade 1 diastolic dysfunction      VASCULAR  vasc mra [facility] p 2013reportedly neg    Carotid duplex February 2020 minimal plaque              Assessment/Plan:      Essential hypertension  Controlled on chlorthalidone no known obstructive CAD with negative stress echo October 2021 she had declined " coronary CT angiogram in the past    CVA (cerebral vascular accident) (CMS/HCC)  Remote history of visual field defect 2013 work-up including loop implantation study for PFO all negative remains on antiplatelet statin therapy    Controlled type 2 diabetes mellitus with complication, without long-term current use of insulin (CMS/HCC)  On metformin hemoglobin A1c at goal 5.7    Mixed hyperlipidemia  On atorvastatin LDL cholesterol at goal 57            Follow-up of hypertension mixed hyperlipidemia diabetes  Questionable remote stroke, unclear  No known obstructive CAD with negative stress echocardiogram today October 2021    Follow-up 1 year with lab  To consider repeat ischemic evaluation would love coronary CTA if she would let me do it, October 2024    Thank you for allowing me to participate in the care of this patient.  I hope this information is helpful.    Jaylen Lynne MD [facility]   11/1/2022  Jonathan Hines Sr., DO

## 2022-10-26 LAB
ALBUMIN SERPL-MCNC: 4.4 G/DL (ref 3.6–5.1)
ALBUMIN/GLOB SERPL: 1.6 (CALC) (ref 1–2.5)
ALP SERPL-CCNC: 92 U/L (ref 37–153)
ALT SERPL-CCNC: 7 U/L (ref 6–29)
AST SERPL-CCNC: 14 U/L (ref 10–35)
BASOPHILS # BLD AUTO: 55 CELLS/UL (ref 0–200)
BASOPHILS NFR BLD AUTO: 0.5 %
BILIRUB SERPL-MCNC: 0.6 MG/DL (ref 0.2–1.2)
BUN SERPL-MCNC: 26 MG/DL (ref 7–25)
BUN/CREAT SERPL: 30 (CALC) (ref 6–22)
CALCIUM SERPL-MCNC: 9.6 MG/DL (ref 8.6–10.4)
CHLORIDE SERPL-SCNC: 102 MMOL/L (ref 98–110)
CHOLEST SERPL-MCNC: 130 MG/DL
CHOLEST/HDLC SERPL: 2.3 (CALC)
CO2 SERPL-SCNC: 30 MMOL/L (ref 20–32)
CREAT SERPL-MCNC: 0.87 MG/DL (ref 0.6–1)
EGFRCR SERPLBLD CKD-EPI 2021: 71 ML/MIN/1.73M2
EOSINOPHIL # BLD AUTO: 403 CELLS/UL (ref 15–500)
EOSINOPHIL NFR BLD AUTO: 3.7 %
ERYTHROCYTE [DISTWIDTH] IN BLOOD BY AUTOMATED COUNT: 12.3 % (ref 11–15)
GLOBULIN SER CALC-MCNC: 2.7 G/DL (CALC) (ref 1.9–3.7)
GLUCOSE SERPL-MCNC: 116 MG/DL (ref 65–99)
HBA1C MFR BLD: 5.8 % OF TOTAL HGB
HCT VFR BLD AUTO: 38.5 % (ref 35–45)
HDLC SERPL-MCNC: 57 MG/DL
HGB BLD-MCNC: 12.5 G/DL (ref 11.7–15.5)
LDLC SERPL CALC-MCNC: 57 MG/DL (CALC)
LYMPHOCYTES # BLD AUTO: 1766 CELLS/UL (ref 850–3900)
LYMPHOCYTES NFR BLD AUTO: 16.2 %
MCH RBC QN AUTO: 30.2 PG (ref 27–33)
MCHC RBC AUTO-ENTMCNC: 32.5 G/DL (ref 32–36)
MCV RBC AUTO: 93 FL (ref 80–100)
MONOCYTES # BLD AUTO: 709 CELLS/UL (ref 200–950)
MONOCYTES NFR BLD AUTO: 6.5 %
NEUTROPHILS # BLD AUTO: 7968 CELLS/UL (ref 1500–7800)
NEUTROPHILS NFR BLD AUTO: 73.1 %
NONHDLC SERPL-MCNC: 73 MG/DL (CALC)
PLATELET # BLD AUTO: 345 THOUSAND/UL (ref 140–400)
PMV BLD REES-ECKER: 11.4 FL (ref 7.5–12.5)
POTASSIUM SERPL-SCNC: 4 MMOL/L (ref 3.5–5.3)
PROT SERPL-MCNC: 7.1 G/DL (ref 6.1–8.1)
RBC # BLD AUTO: 4.14 MILLION/UL (ref 3.8–5.1)
SODIUM SERPL-SCNC: 141 MMOL/L (ref 135–146)
TRIGL SERPL-MCNC: 81 MG/DL
WBC # BLD AUTO: 10.9 THOUSAND/UL (ref 3.8–10.8)

## 2022-11-01 ENCOUNTER — OFFICE VISIT (OUTPATIENT)
Dept: CARDIOLOGY | Facility: CLINIC | Age: 72
End: 2022-11-01
Payer: COMMERCIAL

## 2022-11-01 VITALS
BODY MASS INDEX: 33.84 KG/M2 | DIASTOLIC BLOOD PRESSURE: 80 MMHG | HEIGHT: 63 IN | HEART RATE: 107 BPM | OXYGEN SATURATION: 98 % | RESPIRATION RATE: 16 BRPM | WEIGHT: 191 LBS | SYSTOLIC BLOOD PRESSURE: 126 MMHG

## 2022-11-01 DIAGNOSIS — Z98.890 HISTORY OF LOOP RECORDER: ICD-10-CM

## 2022-11-01 DIAGNOSIS — E78.2 MIXED HYPERLIPIDEMIA: ICD-10-CM

## 2022-11-01 DIAGNOSIS — E11.65 UNCONTROLLED TYPE 2 DIABETES MELLITUS WITH HYPERGLYCEMIA (CMS/HCC): Primary | ICD-10-CM

## 2022-11-01 DIAGNOSIS — E11.8 CONTROLLED TYPE 2 DIABETES MELLITUS WITH COMPLICATION, WITHOUT LONG-TERM CURRENT USE OF INSULIN (CMS/HCC): ICD-10-CM

## 2022-11-01 DIAGNOSIS — I63.89 CEREBROVASCULAR ACCIDENT (CVA) DUE TO OTHER MECHANISM: ICD-10-CM

## 2022-11-01 PROCEDURE — 3079F DIAST BP 80-89 MM HG: CPT | Performed by: INTERNAL MEDICINE

## 2022-11-01 PROCEDURE — 3074F SYST BP LT 130 MM HG: CPT | Performed by: INTERNAL MEDICINE

## 2022-11-01 PROCEDURE — 99214 OFFICE O/P EST MOD 30 MIN: CPT | Performed by: INTERNAL MEDICINE

## 2022-11-01 PROCEDURE — 3008F BODY MASS INDEX DOCD: CPT | Performed by: INTERNAL MEDICINE

## 2022-11-01 PROCEDURE — 93000 ELECTROCARDIOGRAM COMPLETE: CPT | Performed by: INTERNAL MEDICINE

## 2022-11-01 RX ORDER — ATORVASTATIN CALCIUM 10 MG/1
TABLET, FILM COATED ORAL
Qty: 90 TABLET | Refills: 3 | Status: SHIPPED | OUTPATIENT
Start: 2022-11-01 | End: 2023-11-02 | Stop reason: SDUPTHER

## 2022-11-01 RX ORDER — CHLORTHALIDONE 25 MG/1
25 TABLET ORAL DAILY
Qty: 90 TABLET | Refills: 6 | Status: SHIPPED | OUTPATIENT
Start: 2022-11-01 | End: 2023-11-02 | Stop reason: SDUPTHER

## 2022-11-01 RX ORDER — LANCETS 33 GAUGE
EACH MISCELLANEOUS
COMMUNITY
Start: 2022-08-21

## 2022-11-01 RX ORDER — POTASSIUM CHLORIDE 20 MEQ/1
20 TABLET, EXTENDED RELEASE ORAL 2 TIMES DAILY
Qty: 360 TABLET | Refills: 3 | Status: SHIPPED | OUTPATIENT
Start: 2022-11-01 | End: 2023-11-02 | Stop reason: SDUPTHER

## 2022-11-01 NOTE — ASSESSMENT & PLAN NOTE
Controlled on chlorthalidone no known obstructive CAD with negative stress echo October 2021 she had declined coronary CT angiogram in the past

## 2022-11-01 NOTE — ASSESSMENT & PLAN NOTE
Remote history of visual field defect 2013 work-up including loop implantation study for PFO all negative remains on antiplatelet statin therapy

## 2023-06-29 ENCOUNTER — TELEPHONE (OUTPATIENT)
Dept: SCHEDULING | Facility: CLINIC | Age: 73
End: 2023-06-29
Payer: COMMERCIAL

## 2023-06-29 NOTE — TELEPHONE ENCOUNTER
Medical Records Request     Name of caller: Prachi Lopez      Relationship to patient: self    Who’s requesting copy of medical records? Prachi Lopez      Records being requested: Labs slips including Hemoglobin A1C, CBC, Lipid, and CMP    Patient is requesting that lab slips be mailed to her home address:    [address] [place] [id number]      Best contact number: [phone]

## 2023-09-11 LAB
ALBUMIN SERPL-MCNC: 4.4 G/DL (ref 3.6–5.1)
ALBUMIN/GLOB SERPL: 1.6 (CALC) (ref 1–2.5)
ALP SERPL-CCNC: 94 U/L (ref 37–153)
ALT SERPL-CCNC: 9 U/L (ref 6–29)
AST SERPL-CCNC: 14 U/L (ref 10–35)
BASOPHILS # BLD AUTO: 44 CELLS/UL (ref 0–200)
BASOPHILS NFR BLD AUTO: 0.5 %
BILIRUB SERPL-MCNC: 0.4 MG/DL (ref 0.2–1.2)
BUN SERPL-MCNC: 23 MG/DL (ref 7–25)
BUN/CREAT SERPL: ABNORMAL (CALC) (ref 6–22)
CALCIUM SERPL-MCNC: 9.9 MG/DL (ref 8.6–10.4)
CHLORIDE SERPL-SCNC: 100 MMOL/L (ref 98–110)
CHOLEST SERPL-MCNC: 137 MG/DL
CHOLEST/HDLC SERPL: 2.6 (CALC)
CO2 SERPL-SCNC: 32 MMOL/L (ref 20–32)
CREAT SERPL-MCNC: 0.91 MG/DL (ref 0.6–1)
EGFRCR SERPLBLD CKD-EPI 2021: 67 ML/MIN/1.73M2
EOSINOPHIL # BLD AUTO: 278 CELLS/UL (ref 15–500)
EOSINOPHIL NFR BLD AUTO: 3.2 %
ERYTHROCYTE [DISTWIDTH] IN BLOOD BY AUTOMATED COUNT: 13.1 % (ref 11–15)
GLOBULIN SER CALC-MCNC: 2.8 G/DL (CALC) (ref 1.9–3.7)
GLUCOSE SERPL-MCNC: 115 MG/DL (ref 65–99)
HBA1C MFR BLD: 6.5 % OF TOTAL HGB
HCT VFR BLD AUTO: 37.6 % (ref 35–45)
HDLC SERPL-MCNC: 52 MG/DL
HGB BLD-MCNC: 12.7 G/DL (ref 11.7–15.5)
LDLC SERPL CALC-MCNC: 64 MG/DL (CALC)
LYMPHOCYTES # BLD AUTO: 1392 CELLS/UL (ref 850–3900)
LYMPHOCYTES NFR BLD AUTO: 16 %
MCH RBC QN AUTO: 30.3 PG (ref 27–33)
MCHC RBC AUTO-ENTMCNC: 33.8 G/DL (ref 32–36)
MCV RBC AUTO: 89.7 FL (ref 80–100)
MONOCYTES # BLD AUTO: 626 CELLS/UL (ref 200–950)
MONOCYTES NFR BLD AUTO: 7.2 %
NEUTROPHILS # BLD AUTO: 6360 CELLS/UL (ref 1500–7800)
NEUTROPHILS NFR BLD AUTO: 73.1 %
NONHDLC SERPL-MCNC: 85 MG/DL (CALC)
PLATELET # BLD AUTO: 289 THOUSAND/UL (ref 140–400)
PMV BLD REES-ECKER: 11.5 FL (ref 7.5–12.5)
POTASSIUM SERPL-SCNC: 4.3 MMOL/L (ref 3.5–5.3)
PROT SERPL-MCNC: 7.2 G/DL (ref 6.1–8.1)
RBC # BLD AUTO: 4.19 MILLION/UL (ref 3.8–5.1)
SODIUM SERPL-SCNC: 138 MMOL/L (ref 135–146)
TRIGL SERPL-MCNC: 131 MG/DL
WBC # BLD AUTO: 8.7 THOUSAND/UL (ref 3.8–10.8)

## 2023-10-28 NOTE — PROGRESS NOTES
Cardiology Note    Jonathan Benoit Sr.,           Prachi Lopez is a 73 y.o. female   She returns for cardiac follow-up  She has history of hypertension no known obstructive CAD with negative stress echocardiogram in 2021, low exercise tolerance.  we talked about repeat assessment with coronary CT angiogram which she declined  She has a history of possible remote CVA with a visual field defect in 2013 negative work-up for secondary etiologies including  Paroxysmal atrial fibrillation, she had a  A loop implantation negative   work-up for PFO negative   She  remains on antiplatelet therapy    She is treated for   diabetes  Mixed hyperlipidemia    Lab work September 2023  Hemoglobin A1c 6.5  CBC normal  Cholesterol 137 LDL 64 HDL 52  Glucose 115 creatinine 0.91 potassium 4.3                             k    Patient Active Problem List    Diagnosis Date Noted    Controlled type 2 diabetes mellitus with complication, without long-term current use of insulin (CMS/HCC) 04/23/2021    Arthrofibrosis of total knee arthroplasty (CMS/HCC) 10/02/2018    Essential hypertension 09/28/2018    Mixed hyperlipidemia 09/28/2018    Diabetes mellitus type II, non insulin dependent (CMS/HCC) 09/28/2018    CVA (cerebral vascular accident) (CMS/HCC) 09/28/2018    History of loop recorder 09/28/2018       Allergy    No known drug allergies          MED LIST       Current Outpatient Medications   Medication Sig Dispense Refill    aspirin 81 mg enteric coated tablet Take 81 mg by mouth daily.      atorvastatin (LIPITOR) 10 mg tablet TAKE 1 TABLET BY MOUTH ONCE DAILY 90 tablet 3    chlorthalidone (HYGROTON) 25 mg tablet Take 1 tablet (25 mg total) by mouth daily. Brand Needed. 90 tablet 6    metFORMIN (GLUCOPHAGE) 500 mg tablet Take 1 tablet (500 mg total) by mouth 2 (two) times a day with meals. 90 tablet 6    ONETOUCH DELICA PLUS LANCET 33 gauge misc 2 (two) times a day. for testing      potassium chloride (KLOR-CON M20) 20  "mEq CR tablet Take 1 tablet (20 mEq total) by mouth 2 (two) times a day. 360 tablet 3     No current facility-administered medications for this visit.                Review of Systems       Labs   Lab Results   Component Value Date    WBC 8.7 09/11/2023    HGB 12.7 09/11/2023    HCT 37.6 09/11/2023     09/11/2023    CHOL 137 09/11/2023    TRIG 131 09/11/2023    HDL 52 09/11/2023    LDLCALC 64 09/11/2023    ALT 9 09/11/2023    AST 14 09/11/2023     09/11/2023    K 4.3 09/11/2023     09/11/2023    CREATININE 0.91 09/11/2023    BUN 23 09/11/2023    CO2 32 09/11/2023    TSH 2.64 10/19/2021    HGBA1C 6.5 (H) 09/11/2023       Lab Results   Component Value Date    GLUCOSE 115 (H) 09/11/2023    CALCIUM 9.9 09/11/2023     09/11/2023    K 4.3 09/11/2023    CO2 32 09/11/2023     09/11/2023    BUN 23 09/11/2023    CREATININE 0.91 09/11/2023         Objective   Vitals:    11/02/23 0756   BP: 124/80   BP Location: Left upper arm   Patient Position: Sitting   Pulse: 93   SpO2: 97%   Weight: 90.3 kg (199 lb)   Height: 1.626 m (5' 4\")     Physical Exam  Constitutional:       General: She is not in acute distress.     Appearance: She is well-developed.      Comments: Overweight   HENT:      Head: Normocephalic and atraumatic.      Nose: Nose normal.   Eyes:      General: No scleral icterus.     Conjunctiva/sclera: Conjunctivae normal.   Neck:      Vascular: No JVD.   Cardiovascular:      Rate and Rhythm: Normal rate and regular rhythm.      Pulses: Intact distal pulses.      Heart sounds: Normal heart sounds. No murmur heard.     No friction rub. No gallop.   Pulmonary:      Effort: Pulmonary effort is normal. No respiratory distress.      Breath sounds: No stridor. No wheezing or rales.   Chest:      Chest wall: No tenderness.   Abdominal:      Tenderness: There is no abdominal tenderness.   Musculoskeletal:         General: No deformity.   Skin:     General: Skin is warm and dry.   Neurological:      " Mental Status: She is alert and oriented to person, place, and time.           November 2023 EKG normal                Cardiac Procedures      STRESS          Stress echo oct 2021  Neg mild mr  No evidence of ischemia with stress echocardiogram at low exercise level  Low exercise level can decrease sensitivity of the test  The patient completed 4.5 METS of exercise achieved target heart rate  Baseline EKG normal no EKG changes diagnostic of ischemia  Baseline echocardiogram normal chamber sizes no regional wall motion abnormalities preserved ejection fraction 65%  No structural valvular disease  Mild mitral regurgitation trace tricuspid regurgitation estimated pulmonary systolic pressure 28 mmHg  All walls being hyperdynamic with exercise          ELECTROPHYSIOLOGY    2013 IMPLANTABLE DEVICE NO AFIB [facility]        ECHO   echo 2016 lvh normal ef mac mildmr    Joe  2013 [facility] reportedly negative    Echo February 2020 LVH preserved ejection fraction mild MR grade 1 diastolic dysfunction      VASCULAR  vasc mra [facility] p 2013reportedly neg    Carotid duplex February 2020 minimal plaque              Assessment/Plan:      CVA (cerebral vascular accident) (CMS/HCC)  Questionable remote CVA visual defect In 2013 work-up for PFO structural vascular disease of paroxysmal atrial fibrillation were negative at that time.  She is treated with antiplatelet and statin therapy no known obstructive CAD with low-level negative stress echo 2021 I her every year about having other testing either pharmacologic nuclear stress test or coronary CT angiogram, she declines.  She has had no cardiovascular symptoms EKG remains normal she has normal LV systolic function    Mixed hyperlipidemia  On atorvastatin LDL cholesterol at goal 64    Diabetes mellitus type II, non insulin dependent (CMS/HCC)  On metformin hemoglobin A1c 6.5    Essential hypertension  Controlled with diuretic            She follows for hypertension mixed hyperlipidemia  diabetes  Questionable remote stroke, unclear  No known obstructive CAD with negative stress echocardiogram today October 2021  Declines repeat ischemic evaluation either  Exercise test coronary CT angiogram   Her EKG normal and she is having no symptoms  Follow-up 1 year with lab      Jaylen Lynne MD Merged with [facility]   11/2/2023  Jonathan Hines Sr., DO

## 2023-11-02 ENCOUNTER — OFFICE VISIT (OUTPATIENT)
Dept: CARDIOLOGY | Facility: CLINIC | Age: 73
End: 2023-11-02
Payer: COMMERCIAL

## 2023-11-02 VITALS
HEIGHT: 64 IN | HEART RATE: 93 BPM | OXYGEN SATURATION: 97 % | SYSTOLIC BLOOD PRESSURE: 124 MMHG | WEIGHT: 199 LBS | DIASTOLIC BLOOD PRESSURE: 80 MMHG | BODY MASS INDEX: 33.97 KG/M2

## 2023-11-02 DIAGNOSIS — E78.2 MIXED HYPERLIPIDEMIA: Primary | ICD-10-CM

## 2023-11-02 DIAGNOSIS — I10 ESSENTIAL HYPERTENSION: ICD-10-CM

## 2023-11-02 DIAGNOSIS — I63.89 CEREBROVASCULAR ACCIDENT (CVA) DUE TO OTHER MECHANISM: ICD-10-CM

## 2023-11-02 DIAGNOSIS — E11.9 DIABETES MELLITUS TYPE II, NON INSULIN DEPENDENT (CMS/HCC): ICD-10-CM

## 2023-11-02 PROCEDURE — 99214 OFFICE O/P EST MOD 30 MIN: CPT | Performed by: INTERNAL MEDICINE

## 2023-11-02 PROCEDURE — 93000 ELECTROCARDIOGRAM COMPLETE: CPT | Performed by: INTERNAL MEDICINE

## 2023-11-02 PROCEDURE — 3074F SYST BP LT 130 MM HG: CPT | Performed by: INTERNAL MEDICINE

## 2023-11-02 PROCEDURE — 3008F BODY MASS INDEX DOCD: CPT | Performed by: INTERNAL MEDICINE

## 2023-11-02 PROCEDURE — 3079F DIAST BP 80-89 MM HG: CPT | Performed by: INTERNAL MEDICINE

## 2023-11-02 RX ORDER — POTASSIUM CHLORIDE 20 MEQ/1
20 TABLET, EXTENDED RELEASE ORAL 2 TIMES DAILY
Qty: 360 TABLET | Refills: 3 | Status: SHIPPED | OUTPATIENT
Start: 2023-11-02 | End: 2024-11-13 | Stop reason: SDUPTHER

## 2023-11-02 RX ORDER — METFORMIN HYDROCHLORIDE 500 MG/1
500 TABLET ORAL 2 TIMES DAILY WITH MEALS
Qty: 90 TABLET | Refills: 6 | Status: SHIPPED | OUTPATIENT
Start: 2023-11-02

## 2023-11-02 RX ORDER — ATORVASTATIN CALCIUM 10 MG/1
TABLET, FILM COATED ORAL
Qty: 90 TABLET | Refills: 3 | Status: SHIPPED | OUTPATIENT
Start: 2023-11-02 | End: 2024-12-02 | Stop reason: SDUPTHER

## 2023-11-02 RX ORDER — CHLORTHALIDONE 25 MG/1
25 TABLET ORAL DAILY
Qty: 90 TABLET | Refills: 6 | Status: SHIPPED | OUTPATIENT
Start: 2023-11-02 | End: 2024-05-14

## 2023-11-02 NOTE — ASSESSMENT & PLAN NOTE
Questionable remote CVA visual defect In 2013 work-up for PFO structural vascular disease of paroxysmal atrial fibrillation were negative at that time.  She is treated with antiplatelet and statin therapy no known obstructive CAD with low-level negative stress echo 2021 I her every year about having other testing either pharmacologic nuclear stress test or coronary CT angiogram, she declines.  She has had no cardiovascular symptoms EKG remains normal she has normal LV systolic function

## 2024-05-14 ENCOUNTER — TELEPHONE (OUTPATIENT)
Age: 74
End: 2024-05-14

## 2024-05-14 RX ORDER — CHLORTHALIDONE 25 MG/1
25 TABLET ORAL DAILY
Qty: 90 TABLET | Refills: 3 | Status: SHIPPED | OUTPATIENT
Start: 2024-05-14 | End: 2024-12-16 | Stop reason: SDUPTHER

## 2024-05-14 NOTE — TELEPHONE ENCOUNTER
Nemo from Dr. Jarett Myers office (patient's PCP) called stating patient called them and said that we needed records faxed to us.     She is calling because they did not receive a request and wanted to make sure we had their fax number.     She is asking if we can fax an info release request to them at the following fax number: [phone]

## 2024-05-15 NOTE — PROGRESS NOTES
5/16/2024    Assessment & Plan        Problem List Items Addressed This Visit          Digestive    Non-alcoholic fatty liver disease       Endocrine    Adrenal adenoma, left - Primary       Assessment/Plan:  Patient is a 73yF with Pmhx of diabetes, CVA, class 1 obesity who was referred to us for left adrenal nodule     1) Adrenal nodule- left:- MRI confirms adenoma given >50% loss of signal. Does not appear cushinoid or concern for PCC or Conns but will do screening. If normal does not need repeat biochemical workup or imaging unless symptomatic in future     2) NAFLD:- Discussed her MRI does suggest hepatic steatosis which is early signs of NAFLD and can often lead to worsening liver function in future. Would recommend working on weight loss with diet/exercise. Would recommend working on exercising atleast 3x weekly 30 mins daily.     RTC in 1 year    CC: Adrenal nodule     History of Present Illness     HPI: Esther Ren is a 73 y.o. year old female with history of Diabetes, CVA, class 1 obesity who was referred to us for incidental finding of left adrenal adenoma seen on CT 03/24 1.8cm with MRI done after 04/24 showing stable 1.8cm left adrenal nodule with 80% signal lost consisting with adenoma. Here to have biochemical workup for adenoma.     Weight- reports has been losing weight which she is happy about,   Denies any purple rash in abdomen, denies any fractures, denies any neck swelling, Denies any BP issues, does report has BG issues because of her diabetes. Denies Palpitations, Tremors, Diaphoresis, Salt craving.     Review of Systems   Constitutional:  Negative for fatigue and unexpected weight change.   HENT:  Negative for trouble swallowing and voice change.    Eyes:  Negative for photophobia and visual disturbance.   Respiratory:  Negative for choking and shortness of breath.    Gastrointestinal:  Negative for constipation and diarrhea.   Endocrine: Negative for cold intolerance and heat intolerance.  "  Musculoskeletal:  Negative for arthralgias and myalgias.   Skin:  Negative for rash.       Historical Information   Past Medical History:   Diagnosis Date    Stroke (HCC)      Past Surgical History:   Procedure Laterality Date    REPLACEMENT TOTAL KNEE Left      Social History   Social History     Substance and Sexual Activity   Alcohol Use None     Social History     Substance and Sexual Activity   Drug Use Never     Social History     Tobacco Use   Smoking Status Never   Smokeless Tobacco Never     Family History: History reviewed. No pertinent family history.    Meds/Allergies   Current Outpatient Medications   Medication Sig Dispense Refill    aspirin (ECOTRIN LOW STRENGTH) 81 mg EC tablet Take 81 mg by mouth daily      atorvastatin (LIPITOR) 10 mg tablet Take 10 mg by mouth daily      chlorthalidone 25 mg tablet Take 25 mg by mouth daily      latanoprost (XALATAN) 0.005 % ophthalmic solution       metFORMIN (GLUCOPHAGE) 500 mg tablet Take 500 mg by mouth 2 (two) times a day      potassium chloride (Klor-Con M20) 20 mEq tablet        No current facility-administered medications for this visit.     No Known Allergies    Objective   Vitals: Blood pressure 126/78, pulse 93, height 5' 4\" (1.626 m), weight 81.7 kg (180 lb 3.2 oz), SpO2 99%.  Invasive Devices       None                 Body mass index is 30.93 kg/m².  /78   Pulse 93   Ht 5' 4\" (1.626 m)   Wt 81.7 kg (180 lb 3.2 oz)   SpO2 99%   BMI 30.93 kg/m²    Wt Readings from Last 3 Encounters:   05/16/24 81.7 kg (180 lb 3.2 oz)           Physical Exam  Constitutional:       Appearance: Normal appearance. She is obese.   Cardiovascular:      Rate and Rhythm: Normal rate and regular rhythm.      Pulses: Normal pulses.   Pulmonary:      Effort: Pulmonary effort is normal.   Abdominal:      General: Abdomen is flat. Bowel sounds are normal.      Palpations: Abdomen is soft.   Skin:     General: Skin is warm and dry.      Capillary Refill: Capillary " "refill takes less than 2 seconds.   Neurological:      General: No focal deficit present.      Mental Status: She is alert and oriented to person, place, and time.   Psychiatric:         Mood and Affect: Mood normal.         The history was obtained from the review of the chart and from the patient.    Lab Results:      No components found for: \"HA1C\"  No components found for: \"GLU\"    No results found for: \"CREATININE\", \"BUN\", \"NA\", \"K\", \"CL\", \"CO2\"  No results found for: \"EGFR\"  No components found for: \"MALBCRER\"    No results found for: \"CHOL\", \"HDL\", \"LDLCAL\", \"TRIG\", \"CHOLHDL\"    No results found for: \"ALT\", \"AST\", \"GGT\", \"ALKPHOS\", \"BILITOT\"    No results found for: \"TSH\", \"FREET4\", \"TSI\"    No future appointments.    "

## 2024-05-16 ENCOUNTER — CONSULT (OUTPATIENT)
Dept: ENDOCRINOLOGY | Facility: HOSPITAL | Age: 74
End: 2024-05-16

## 2024-05-16 VITALS
WEIGHT: 180.2 LBS | HEIGHT: 64 IN | SYSTOLIC BLOOD PRESSURE: 126 MMHG | DIASTOLIC BLOOD PRESSURE: 78 MMHG | BODY MASS INDEX: 30.77 KG/M2 | OXYGEN SATURATION: 99 % | HEART RATE: 93 BPM

## 2024-05-16 DIAGNOSIS — D35.02 ADRENAL ADENOMA, LEFT: Primary | ICD-10-CM

## 2024-05-16 DIAGNOSIS — K76.0 NON-ALCOHOLIC FATTY LIVER DISEASE: ICD-10-CM

## 2024-05-16 PROBLEM — E10.9 TYPE 1 DIABETES MELLITUS WITHOUT COMPLICATION (HCC): Status: ACTIVE | Noted: 2024-05-16

## 2024-05-16 PROBLEM — E11.9 TYPE 2 DIABETES MELLITUS WITHOUT COMPLICATION (HCC): Status: ACTIVE | Noted: 2024-05-16

## 2024-05-16 PROBLEM — Z86.73 HISTORY OF CVA (CEREBROVASCULAR ACCIDENT): Status: ACTIVE | Noted: 2024-05-16

## 2024-05-16 PROCEDURE — 99244 OFF/OP CNSLTJ NEW/EST MOD 40: CPT | Performed by: STUDENT IN AN ORGANIZED HEALTH CARE EDUCATION/TRAINING PROGRAM

## 2024-05-16 RX ORDER — POTASSIUM CHLORIDE 20 MEQ/1
TABLET, EXTENDED RELEASE ORAL
COMMUNITY
Start: 2024-04-18

## 2024-05-16 RX ORDER — ATORVASTATIN CALCIUM 10 MG/1
10 TABLET, FILM COATED ORAL DAILY
COMMUNITY
Start: 2024-03-22

## 2024-05-16 RX ORDER — CHLORTHALIDONE 25 MG/1
25 TABLET ORAL DAILY
COMMUNITY
Start: 2024-05-14

## 2024-05-16 RX ORDER — DEXAMETHASONE 1 MG
1 TABLET ORAL ONCE
Qty: 1 TABLET | Refills: 0 | Status: SHIPPED | OUTPATIENT
Start: 2024-05-16 | End: 2024-05-16

## 2024-05-16 RX ORDER — ASPIRIN 81 MG/1
81 TABLET ORAL DAILY
COMMUNITY

## 2024-05-16 RX ORDER — LATANOPROST 50 UG/ML
SOLUTION/ DROPS OPHTHALMIC
COMMUNITY
Start: 2024-04-15

## 2024-05-23 ENCOUNTER — TELEPHONE (OUTPATIENT)
Dept: SCHEDULING | Facility: CLINIC | Age: 74
End: 2024-05-23
Payer: COMMERCIAL

## 2024-05-23 NOTE — TELEPHONE ENCOUNTER
Radha - Dr. Itzel Soriano's office called states they would like to start pt on timolol and states this a beta blocker. Radha requests a call back regarding approval from Dr. Lynne to start pt on this medication    Radha can be reached at

## 2024-05-24 NOTE — TELEPHONE ENCOUNTER
TONJA  Good morning Radha.  This is Helga from Dr. Lynne's office.  Returning your call about Prachi. Dr. Soriano wants to start her on timolol which is a beta blocker and wants Dr. Lynne's approval.  Dr. Lynne is out of office and will return on Tuesday 5/28/24.  She will review this request and return phone call with her decision on Tuesday.  If you have any questions, please call [phone].. Have a great day.

## 2024-05-28 NOTE — TELEPHONE ENCOUNTER
Called and spoke with Radha.  Dr. Lynne said it is okay for Prachi to be on timolol.  Radha understands.  No questions.

## 2024-06-01 LAB
ACTH PLAS-MCNC: 5 PG/ML (ref 6–50)
ALBUMIN SERPL-MCNC: 4.4 G/DL (ref 3.6–5.1)
ALBUMIN/GLOB SERPL: 1.5 (CALC) (ref 1–2.5)
ALDOST SERPL-MCNC: 24 NG/DL
ALP SERPL-CCNC: 96 U/L (ref 37–153)
ALT SERPL-CCNC: 12 U/L (ref 6–29)
AST SERPL-CCNC: 17 U/L (ref 10–35)
BILIRUB SERPL-MCNC: 0.5 MG/DL (ref 0.2–1.2)
BUN SERPL-MCNC: 19 MG/DL (ref 7–25)
BUN/CREAT SERPL: ABNORMAL (CALC) (ref 6–22)
CALCIUM SERPL-MCNC: 9.9 MG/DL (ref 8.6–10.4)
CHLORIDE SERPL-SCNC: 101 MMOL/L (ref 98–110)
CO2 SERPL-SCNC: 27 MMOL/L (ref 20–32)
CORTIS AM PEAK SERPL-MCNC: 1.8 MCG/DL
CREAT SERPL-MCNC: 0.86 MG/DL (ref 0.6–1)
GFR/BSA.PRED SERPLBLD CYS-BASED-ARV: 72 ML/MIN/1.73M2
GLOBULIN SER CALC-MCNC: 2.9 G/DL (CALC) (ref 1.9–3.7)
GLUCOSE SERPL-MCNC: 122 MG/DL (ref 65–99)
METANEPH FREE SERPL-MCNC: <25 PG/ML
METANEPHS SERPL-MCNC: 69 PG/ML
NORMETANEPH FREE SERPL-MCNC: 69 PG/ML
POTASSIUM SERPL-SCNC: 4.4 MMOL/L (ref 3.5–5.3)
PROT SERPL-MCNC: 7.3 G/DL (ref 6.1–8.1)
SODIUM SERPL-SCNC: 139 MMOL/L (ref 135–146)

## 2024-06-04 LAB
ACTH PLAS-MCNC: 5 PG/ML (ref 6–50)
ALBUMIN SERPL-MCNC: 4.4 G/DL (ref 3.6–5.1)
ALBUMIN/GLOB SERPL: 1.5 (CALC) (ref 1–2.5)
ALDOST SERPL-MCNC: 24 NG/DL
ALDOST/RENIN PLAS-RTO: 5.7 RATIO (ref 0.9–28.9)
ALP SERPL-CCNC: 96 U/L (ref 37–153)
ALT SERPL-CCNC: 12 U/L (ref 6–29)
AST SERPL-CCNC: 17 U/L (ref 10–35)
BILIRUB SERPL-MCNC: 0.5 MG/DL (ref 0.2–1.2)
BUN SERPL-MCNC: 19 MG/DL (ref 7–25)
BUN/CREAT SERPL: ABNORMAL (CALC) (ref 6–22)
CALCIUM SERPL-MCNC: 9.9 MG/DL (ref 8.6–10.4)
CHLORIDE SERPL-SCNC: 101 MMOL/L (ref 98–110)
CO2 SERPL-SCNC: 27 MMOL/L (ref 20–32)
CORTIS AM PEAK SERPL-MCNC: 1.8 MCG/DL
CREAT SERPL-MCNC: 0.86 MG/DL (ref 0.6–1)
DEXAMETHASONE SERPL-MCNC: 344 NG/DL
GFR/BSA.PRED SERPLBLD CYS-BASED-ARV: 72 ML/MIN/1.73M2
GLOBULIN SER CALC-MCNC: 2.9 G/DL (CALC) (ref 1.9–3.7)
GLUCOSE SERPL-MCNC: 122 MG/DL (ref 65–99)
METANEPH FREE SERPL-MCNC: <25 PG/ML
METANEPHS SERPL-MCNC: 69 PG/ML
NORMETANEPH FREE SERPL-MCNC: 69 PG/ML
POTASSIUM SERPL-SCNC: 4.4 MMOL/L (ref 3.5–5.3)
PROT SERPL-MCNC: 7.3 G/DL (ref 6.1–8.1)
RENIN PLAS-CCNC: 4.22 NG/ML/H (ref 0.25–5.82)
SODIUM SERPL-SCNC: 139 MMOL/L (ref 135–146)

## 2024-07-09 ENCOUNTER — TELEPHONE (OUTPATIENT)
Age: 74
End: 2024-07-09

## 2024-11-13 RX ORDER — POTASSIUM CHLORIDE 20 MEQ/1
20 TABLET, EXTENDED RELEASE ORAL 2 TIMES DAILY
Qty: 360 TABLET | Refills: 3 | Status: SHIPPED | OUTPATIENT
Start: 2024-11-13

## 2024-12-02 DIAGNOSIS — E78.2 MIXED HYPERLIPIDEMIA: Primary | ICD-10-CM

## 2024-12-02 RX ORDER — ATORVASTATIN CALCIUM 10 MG/1
TABLET, FILM COATED ORAL
Qty: 90 TABLET | Refills: 3 | Status: SHIPPED | OUTPATIENT
Start: 2024-12-02

## 2024-12-16 RX ORDER — CHLORTHALIDONE 25 MG/1
25 TABLET ORAL DAILY
Qty: 90 TABLET | Refills: 3 | Status: SHIPPED | OUTPATIENT
Start: 2024-12-16